# Patient Record
Sex: FEMALE | Race: WHITE | Employment: OTHER | URBAN - METROPOLITAN AREA
[De-identification: names, ages, dates, MRNs, and addresses within clinical notes are randomized per-mention and may not be internally consistent; named-entity substitution may affect disease eponyms.]

---

## 2017-02-26 ENCOUNTER — HOSPITAL ENCOUNTER (EMERGENCY)
Age: 67
Discharge: HOME | End: 2017-02-26
Attending: EMERGENCY MEDICINE
Payer: MEDICARE

## 2017-02-26 VITALS
DIASTOLIC BLOOD PRESSURE: 82 MMHG | HEART RATE: 83 BPM | RESPIRATION RATE: 16 BRPM | BODY MASS INDEX: 46.95 KG/M2 | WEIGHT: 275 LBS | SYSTOLIC BLOOD PRESSURE: 175 MMHG | OXYGEN SATURATION: 96 % | HEIGHT: 64 IN | TEMPERATURE: 98.1 F

## 2017-02-26 DIAGNOSIS — J02.9 ACUTE PHARYNGITIS, UNSPECIFIED ETIOLOGY: ICD-10-CM

## 2017-02-26 DIAGNOSIS — J20.9 ACUTE BRONCHITIS, UNSPECIFIED ORGANISM: Primary | ICD-10-CM

## 2017-02-26 PROCEDURE — 6370000000 HC RX 637 (ALT 250 FOR IP): Performed by: EMERGENCY MEDICINE

## 2017-02-26 PROCEDURE — 99283 EMERGENCY DEPT VISIT LOW MDM: CPT

## 2017-02-26 RX ORDER — AZITHROMYCIN 250 MG/1
250 TABLET, FILM COATED ORAL DAILY
Qty: 4 TABLET | Refills: 0 | Status: SHIPPED | OUTPATIENT
Start: 2017-02-26

## 2017-02-26 RX ORDER — AZITHROMYCIN 250 MG/1
500 TABLET, FILM COATED ORAL DAILY
Status: DISCONTINUED | OUTPATIENT
Start: 2017-02-26 | End: 2017-02-26 | Stop reason: HOSPADM

## 2017-02-26 RX ADMIN — AZITHROMYCIN 500 MG: 250 TABLET, FILM COATED ORAL at 15:35

## 2017-02-26 ASSESSMENT — ENCOUNTER SYMPTOMS
TROUBLE SWALLOWING: 0
SHORTNESS OF BREATH: 0
DIARRHEA: 0
BACK PAIN: 0
SORE THROAT: 1
VOMITING: 0
NAUSEA: 0
ABDOMINAL PAIN: 0
COLOR CHANGE: 0
COUGH: 1
CHEST TIGHTNESS: 0

## 2017-02-26 ASSESSMENT — PAIN DESCRIPTION - PAIN TYPE: TYPE: ACUTE PAIN

## 2017-02-26 ASSESSMENT — PAIN DESCRIPTION - DESCRIPTORS: DESCRIPTORS: OTHER (COMMENT)

## 2017-02-26 ASSESSMENT — PAIN DESCRIPTION - FREQUENCY: FREQUENCY: INTERMITTENT

## 2017-02-26 ASSESSMENT — PAIN DESCRIPTION - LOCATION: LOCATION: CHEST

## 2017-02-26 ASSESSMENT — PAIN SCALES - GENERAL: PAINLEVEL_OUTOF10: 6

## 2022-12-15 ENCOUNTER — APPOINTMENT (OUTPATIENT)
Dept: CT IMAGING | Age: 72
DRG: 202 | End: 2022-12-15
Payer: COMMERCIAL

## 2022-12-15 ENCOUNTER — HOSPITAL ENCOUNTER (INPATIENT)
Age: 72
LOS: 1 days | Discharge: HOME | DRG: 202 | End: 2022-12-19
Attending: INTERNAL MEDICINE | Admitting: INTERNAL MEDICINE
Payer: COMMERCIAL

## 2022-12-15 ENCOUNTER — APPOINTMENT (OUTPATIENT)
Dept: GENERAL RADIOLOGY | Age: 72
DRG: 202 | End: 2022-12-15
Payer: COMMERCIAL

## 2022-12-15 DIAGNOSIS — R06.82 TACHYPNEA: ICD-10-CM

## 2022-12-15 DIAGNOSIS — D64.9 ANEMIA, UNSPECIFIED TYPE: Primary | ICD-10-CM

## 2022-12-15 DIAGNOSIS — E11.69 TYPE 2 DIABETES MELLITUS WITH OTHER SPECIFIED COMPLICATION, WITHOUT LONG-TERM CURRENT USE OF INSULIN (HCC): ICD-10-CM

## 2022-12-15 DIAGNOSIS — R06.02 SHORTNESS OF BREATH: ICD-10-CM

## 2022-12-15 LAB
ALBUMIN SERPL-MCNC: 4.3 G/DL (ref 3.5–4.6)
ALP BLD-CCNC: 108 U/L (ref 40–130)
ALT SERPL-CCNC: 17 U/L (ref 0–33)
AMPHETAMINE SCREEN, URINE: NORMAL
ANION GAP SERPL CALCULATED.3IONS-SCNC: 13 MEQ/L (ref 9–15)
APTT: 26.7 SEC (ref 24.4–36.8)
AST SERPL-CCNC: 20 U/L (ref 0–35)
BARBITURATE SCREEN URINE: NORMAL
BASE EXCESS ARTERIAL: 2 (ref -3–3)
BASOPHILS ABSOLUTE: 0 K/UL (ref 0–0.2)
BASOPHILS RELATIVE PERCENT: 0.6 %
BENZODIAZEPINE SCREEN, URINE: NORMAL
BILIRUB SERPL-MCNC: 0.3 MG/DL (ref 0.2–0.7)
BILIRUBIN URINE: NEGATIVE
BLOOD, URINE: NEGATIVE
BUN BLDV-MCNC: 18 MG/DL (ref 8–23)
CALCIUM IONIZED: 1.13 MMOL/L (ref 1.12–1.32)
CALCIUM SERPL-MCNC: 10 MG/DL (ref 8.5–9.9)
CANNABINOID SCREEN URINE: NORMAL
CHLORIDE BLD-SCNC: 99 MEQ/L (ref 95–107)
CLARITY: CLEAR
CO2: 29 MEQ/L (ref 20–31)
COCAINE METABOLITE SCREEN URINE: NORMAL
COLOR: YELLOW
CREAT SERPL-MCNC: 0.62 MG/DL (ref 0.5–0.9)
EOSINOPHILS ABSOLUTE: 0 K/UL (ref 0–0.7)
EOSINOPHILS RELATIVE PERCENT: 0.8 %
FENTANYL SCREEN, URINE: NORMAL
GFR SERPL CREATININE-BSD FRML MDRD: >60 ML/MIN/{1.73_M2}
GFR SERPL CREATININE-BSD FRML MDRD: >60 ML/MIN/{1.73_M2}
GLOBULIN: 2.5 G/DL (ref 2.3–3.5)
GLUCOSE BLD-MCNC: 109 MG/DL (ref 70–99)
GLUCOSE BLD-MCNC: 110 MG/DL (ref 70–99)
GLUCOSE BLD-MCNC: 164 MG/DL (ref 70–99)
GLUCOSE URINE: NEGATIVE MG/DL
HCO3 ARTERIAL: 22 MMOL/L (ref 21–29)
HCT VFR BLD CALC: 27.9 % (ref 37–47)
HEMOGLOBIN: 13.3 GM/DL (ref 12–16)
HEMOGLOBIN: 9.1 G/DL (ref 12–16)
INFLUENZA A BY PCR: NEGATIVE
INFLUENZA B BY PCR: NEGATIVE
INR BLD: 0.9
KETONES, URINE: NEGATIVE MG/DL
LACTATE: 0.98 MMOL/L (ref 0.4–2)
LACTIC ACID, SEPSIS: 1.3 MMOL/L (ref 0.5–1.9)
LEUKOCYTE ESTERASE, URINE: NEGATIVE
LIPASE: 15 U/L (ref 12–95)
LYMPHOCYTES ABSOLUTE: 1.4 K/UL (ref 1–4.8)
LYMPHOCYTES RELATIVE PERCENT: 25.1 %
Lab: NORMAL
MAGNESIUM: 2.1 MG/DL (ref 1.7–2.4)
MCH RBC QN AUTO: 30.3 PG (ref 27–31.3)
MCHC RBC AUTO-ENTMCNC: 32.6 % (ref 33–37)
MCV RBC AUTO: 93 FL (ref 79.4–94.8)
METHADONE SCREEN, URINE: NORMAL
MONOCYTES ABSOLUTE: 0.4 K/UL (ref 0.2–0.8)
MONOCYTES RELATIVE PERCENT: 7.2 %
NEUTROPHILS ABSOLUTE: 3.8 K/UL (ref 1.4–6.5)
NEUTROPHILS RELATIVE PERCENT: 66.3 %
NITRITE, URINE: NEGATIVE
O2 SAT, ARTERIAL: 99 % (ref 93–100)
OPIATE SCREEN URINE: NORMAL
OXYCODONE URINE: NORMAL
PCO2 ARTERIAL: 18 MM HG (ref 35–45)
PDW BLD-RTO: 14.2 % (ref 11.5–14.5)
PERFORMED ON: ABNORMAL
PERFORMED ON: ABNORMAL
PH ARTERIAL: 7.7 (ref 7.35–7.45)
PH UA: 8.5 (ref 5–9)
PHENCYCLIDINE SCREEN URINE: NORMAL
PLATELET # BLD: 220 K/UL (ref 130–400)
PO2 ARTERIAL: 98 MM HG (ref 75–108)
POC CHLORIDE: 107 MEQ/L (ref 99–110)
POC CREATININE WHOLE BLOOD: 0.2
POC CREATININE: 0.7 MG/DL (ref 0.6–1.2)
POC FIO2: 21
POC HEMATOCRIT: 39 % (ref 36–48)
POC POTASSIUM: 3.1 MEQ/L (ref 3.5–5.1)
POC SAMPLE TYPE: ABNORMAL
POC SODIUM: 140 MEQ/L (ref 136–145)
POTASSIUM SERPL-SCNC: 3.5 MEQ/L (ref 3.4–4.9)
PRO-BNP: 70 PG/ML
PROCALCITONIN: 0.02 NG/ML (ref 0–0.15)
PROPOXYPHENE SCREEN: NORMAL
PROTEIN UA: NEGATIVE MG/DL
PROTHROMBIN TIME: 11.8 SEC (ref 12.3–14.9)
RBC # BLD: 3 M/UL (ref 4.2–5.4)
SARS-COV-2, NAAT: NOT DETECTED
SODIUM BLD-SCNC: 141 MEQ/L (ref 135–144)
SPECIFIC GRAVITY UA: 1.01 (ref 1–1.03)
TCO2 ARTERIAL: 23 MMOL/L (ref 21–32)
TOTAL CK: 72 U/L (ref 0–170)
TOTAL PROTEIN: 6.8 G/DL (ref 6.3–8)
TROPONIN: <0.01 NG/ML (ref 0–0.01)
TSH REFLEX: 1.12 UIU/ML (ref 0.44–3.86)
URINE REFLEX TO CULTURE: NORMAL
UROBILINOGEN, URINE: 0.2 E.U./DL
WBC # BLD: 5.8 K/UL (ref 4.8–10.8)

## 2022-12-15 PROCEDURE — 83690 ASSAY OF LIPASE: CPT

## 2022-12-15 PROCEDURE — 84484 ASSAY OF TROPONIN QUANT: CPT

## 2022-12-15 PROCEDURE — 83605 ASSAY OF LACTIC ACID: CPT

## 2022-12-15 PROCEDURE — 93005 ELECTROCARDIOGRAM TRACING: CPT | Performed by: EMERGENCY MEDICINE

## 2022-12-15 PROCEDURE — 71275 CT ANGIOGRAPHY CHEST: CPT

## 2022-12-15 PROCEDURE — 6360000004 HC RX CONTRAST MEDICATION: Performed by: PERSONAL EMERGENCY RESPONSE ATTENDANT

## 2022-12-15 PROCEDURE — 84132 ASSAY OF SERUM POTASSIUM: CPT

## 2022-12-15 PROCEDURE — 81003 URINALYSIS AUTO W/O SCOPE: CPT

## 2022-12-15 PROCEDURE — 82803 BLOOD GASES ANY COMBINATION: CPT

## 2022-12-15 PROCEDURE — 85014 HEMATOCRIT: CPT

## 2022-12-15 PROCEDURE — 85025 COMPLETE CBC W/AUTO DIFF WBC: CPT

## 2022-12-15 PROCEDURE — 36415 COLL VENOUS BLD VENIPUNCTURE: CPT

## 2022-12-15 PROCEDURE — 87502 INFLUENZA DNA AMP PROBE: CPT

## 2022-12-15 PROCEDURE — 87040 BLOOD CULTURE FOR BACTERIA: CPT

## 2022-12-15 PROCEDURE — 82607 VITAMIN B-12: CPT

## 2022-12-15 PROCEDURE — 36600 WITHDRAWAL OF ARTERIAL BLOOD: CPT

## 2022-12-15 PROCEDURE — 99285 EMERGENCY DEPT VISIT HI MDM: CPT

## 2022-12-15 PROCEDURE — 84295 ASSAY OF SERUM SODIUM: CPT

## 2022-12-15 PROCEDURE — 71045 X-RAY EXAM CHEST 1 VIEW: CPT

## 2022-12-15 PROCEDURE — 82948 REAGENT STRIP/BLOOD GLUCOSE: CPT

## 2022-12-15 PROCEDURE — 85730 THROMBOPLASTIN TIME PARTIAL: CPT

## 2022-12-15 PROCEDURE — 87150 DNA/RNA AMPLIFIED PROBE: CPT

## 2022-12-15 PROCEDURE — G0378 HOSPITAL OBSERVATION PER HR: HCPCS

## 2022-12-15 PROCEDURE — 84145 PROCALCITONIN (PCT): CPT

## 2022-12-15 PROCEDURE — 85610 PROTHROMBIN TIME: CPT

## 2022-12-15 PROCEDURE — 82550 ASSAY OF CK (CPK): CPT

## 2022-12-15 PROCEDURE — 83550 IRON BINDING TEST: CPT

## 2022-12-15 PROCEDURE — 82728 ASSAY OF FERRITIN: CPT

## 2022-12-15 PROCEDURE — 83735 ASSAY OF MAGNESIUM: CPT

## 2022-12-15 PROCEDURE — 82435 ASSAY OF BLOOD CHLORIDE: CPT

## 2022-12-15 PROCEDURE — 96375 TX/PRO/DX INJ NEW DRUG ADDON: CPT

## 2022-12-15 PROCEDURE — 82746 ASSAY OF FOLIC ACID SERUM: CPT

## 2022-12-15 PROCEDURE — 87635 SARS-COV-2 COVID-19 AMP PRB: CPT

## 2022-12-15 PROCEDURE — 6360000002 HC RX W HCPCS: Performed by: PERSONAL EMERGENCY RESPONSE ATTENDANT

## 2022-12-15 PROCEDURE — 80053 COMPREHEN METABOLIC PANEL: CPT

## 2022-12-15 PROCEDURE — 82330 ASSAY OF CALCIUM: CPT

## 2022-12-15 PROCEDURE — 80307 DRUG TEST PRSMV CHEM ANLYZR: CPT

## 2022-12-15 PROCEDURE — 84443 ASSAY THYROID STIM HORMONE: CPT

## 2022-12-15 PROCEDURE — 82565 ASSAY OF CREATININE: CPT

## 2022-12-15 PROCEDURE — 96365 THER/PROPH/DIAG IV INF INIT: CPT

## 2022-12-15 PROCEDURE — 70450 CT HEAD/BRAIN W/O DYE: CPT

## 2022-12-15 PROCEDURE — 83540 ASSAY OF IRON: CPT

## 2022-12-15 PROCEDURE — 83880 ASSAY OF NATRIURETIC PEPTIDE: CPT

## 2022-12-15 RX ORDER — MAGNESIUM SULFATE IN WATER 40 MG/ML
2000 INJECTION, SOLUTION INTRAVENOUS ONCE
Status: COMPLETED | OUTPATIENT
Start: 2022-12-15 | End: 2022-12-15

## 2022-12-15 RX ORDER — METHYLPREDNISOLONE SODIUM SUCCINATE 125 MG/2ML
125 INJECTION, POWDER, LYOPHILIZED, FOR SOLUTION INTRAMUSCULAR; INTRAVENOUS ONCE
Status: COMPLETED | OUTPATIENT
Start: 2022-12-15 | End: 2022-12-15

## 2022-12-15 RX ADMIN — METHYLPREDNISOLONE SODIUM SUCCINATE 125 MG: 125 INJECTION, POWDER, FOR SOLUTION INTRAMUSCULAR; INTRAVENOUS at 19:13

## 2022-12-15 RX ADMIN — MAGNESIUM SULFATE HEPTAHYDRATE 2000 MG: 40 INJECTION, SOLUTION INTRAVENOUS at 19:10

## 2022-12-15 RX ADMIN — IOPAMIDOL 50 ML: 612 INJECTION, SOLUTION INTRAVENOUS at 20:29

## 2022-12-15 ASSESSMENT — ENCOUNTER SYMPTOMS
DIARRHEA: 1
SORE THROAT: 0
WHEEZING: 1
VOMITING: 0
RHINORRHEA: 0
COUGH: 1
BLOOD IN STOOL: 0
COLOR CHANGE: 0
NAUSEA: 0
ABDOMINAL PAIN: 0
SHORTNESS OF BREATH: 1

## 2022-12-15 ASSESSMENT — PAIN - FUNCTIONAL ASSESSMENT: PAIN_FUNCTIONAL_ASSESSMENT: NONE - DENIES PAIN

## 2022-12-15 NOTE — ED TRIAGE NOTES
PATIENT TO ED WITH C/O SOB X 1 WEEK  PATIENT DENIES  OXYGEN USE AT HOME  PATIENT STATES SOB WORSENED TODAY. PATIENT 100% RA  PATIENT GIVEN DUONEB ENROUTE TO ED BY EMS  PATIENT STATES SHE USUALLY GETS AN ANTIBIOTIC, ZPAK AND GETS BETTER.    PATIENT ALERT AND ORIENTED X 4  PATIENT PLACED ON MONITOR  SKIN WARM AND DRY  PATIENT AFEBRILE

## 2022-12-15 NOTE — ED NOTES
Pt c/o of SOB. Pt states she was at urgent care when they sent her here. Pt able to ambulate to toilet from stretcher. Pt has steady gait. Pt able to answer questions appropriately. Pt placed on cardiac monitor, ekg done at bedside.  Pt is a&ox4     Andreia Bates RN  12/15/22 2029

## 2022-12-16 PROBLEM — E87.3 ACUTE RESPIRATORY ALKALOSIS: Status: ACTIVE | Noted: 2022-12-16

## 2022-12-16 PROBLEM — R06.02 SHORTNESS OF BREATH: Status: ACTIVE | Noted: 2022-12-16

## 2022-12-16 LAB
BASE EXCESS ARTERIAL: -2 (ref -3–3)
BASOPHILS ABSOLUTE: 0 K/UL (ref 0–0.2)
BASOPHILS RELATIVE PERCENT: 0.2 %
CALCIUM IONIZED: 1.2 MMOL/L (ref 1.12–1.32)
EKG ATRIAL RATE: 79 BPM
EKG P AXIS: 35 DEGREES
EKG P-R INTERVAL: 158 MS
EKG Q-T INTERVAL: 412 MS
EKG QRS DURATION: 88 MS
EKG QTC CALCULATION (BAZETT): 472 MS
EKG R AXIS: -20 DEGREES
EKG T AXIS: 32 DEGREES
EKG VENTRICULAR RATE: 79 BPM
EOSINOPHILS ABSOLUTE: 0 K/UL (ref 0–0.7)
EOSINOPHILS RELATIVE PERCENT: 0 %
FERRITIN: 28 NG/ML (ref 13–150)
FOLATE: >20 NG/ML
GFR SERPL CREATININE-BSD FRML MDRD: >60 ML/MIN/{1.73_M2}
GFR SERPL CREATININE-BSD FRML MDRD: >60 ML/MIN/{1.73_M2}
GLUCOSE BLD-MCNC: 248 MG/DL (ref 70–99)
GLUCOSE BLD-MCNC: 253 MG/DL (ref 70–99)
GLUCOSE BLD-MCNC: 276 MG/DL (ref 70–99)
GLUCOSE BLD-MCNC: 277 MG/DL (ref 70–99)
GLUCOSE BLD-MCNC: 288 MG/DL (ref 70–99)
HCO3 ARTERIAL: 22.7 MMOL/L (ref 21–29)
HCT VFR BLD CALC: 38.6 % (ref 37–47)
HEMOGLOBIN: 12.8 G/DL (ref 12–16)
HEMOGLOBIN: 13.6 GM/DL (ref 12–16)
IRON SATURATION: 15 % (ref 20–55)
IRON: 58 UG/DL (ref 37–145)
LACTATE: 0.63 MMOL/L (ref 0.4–2)
LYMPHOCYTES ABSOLUTE: 0.7 K/UL (ref 1–4.8)
LYMPHOCYTES RELATIVE PERCENT: 8.7 %
MCH RBC QN AUTO: 30.3 PG (ref 27–31.3)
MCHC RBC AUTO-ENTMCNC: 33.2 % (ref 33–37)
MCV RBC AUTO: 91.3 FL (ref 79.4–94.8)
MONOCYTES ABSOLUTE: 0.1 K/UL (ref 0.2–0.8)
MONOCYTES RELATIVE PERCENT: 1.7 %
NEUTROPHILS ABSOLUTE: 6.8 K/UL (ref 1.4–6.5)
NEUTROPHILS RELATIVE PERCENT: 89.4 %
O2 SAT, ARTERIAL: 96 % (ref 93–100)
PCO2 ARTERIAL: 35 MM HG (ref 35–45)
PDW BLD-RTO: 14.6 % (ref 11.5–14.5)
PERFORMED ON: ABNORMAL
PH ARTERIAL: 7.41 (ref 7.35–7.45)
PLATELET # BLD: 319 K/UL (ref 130–400)
PO2 ARTERIAL: 82 MM HG (ref 75–108)
POC CHLORIDE: 107 MEQ/L (ref 99–110)
POC CREATININE: 0.6 MG/DL (ref 0.6–1.2)
POC CREATININE: <0.2 MG/DL (ref 0.6–1.2)
POC HEMATOCRIT: 40 % (ref 36–48)
POC POTASSIUM: 3.8 MEQ/L (ref 3.5–5.1)
POC SAMPLE TYPE: ABNORMAL
POC SAMPLE TYPE: ABNORMAL
POC SODIUM: 140 MEQ/L (ref 136–145)
RBC # BLD: 4.23 M/UL (ref 4.2–5.4)
REASON FOR REJECTION: NORMAL
REJECTED TEST: NORMAL
TCO2 ARTERIAL: 24 MMOL/L (ref 21–32)
TOTAL IRON BINDING CAPACITY: 380 UG/DL (ref 250–450)
UNSATURATED IRON BINDING CAPACITY: 322 UG/DL (ref 112–347)
VITAMIN B-12: 1853 PG/ML (ref 232–1245)
WBC # BLD: 7.7 K/UL (ref 4.8–10.8)

## 2022-12-16 PROCEDURE — G0378 HOSPITAL OBSERVATION PER HR: HCPCS

## 2022-12-16 PROCEDURE — 94761 N-INVAS EAR/PLS OXIMETRY MLT: CPT

## 2022-12-16 PROCEDURE — 94667 MNPJ CHEST WALL 1ST: CPT

## 2022-12-16 PROCEDURE — 6370000000 HC RX 637 (ALT 250 FOR IP): Performed by: INTERNAL MEDICINE

## 2022-12-16 PROCEDURE — 82435 ASSAY OF BLOOD CHLORIDE: CPT

## 2022-12-16 PROCEDURE — 82330 ASSAY OF CALCIUM: CPT

## 2022-12-16 PROCEDURE — 36600 WITHDRAWAL OF ARTERIAL BLOOD: CPT

## 2022-12-16 PROCEDURE — 82565 ASSAY OF CREATININE: CPT

## 2022-12-16 PROCEDURE — 82803 BLOOD GASES ANY COMBINATION: CPT

## 2022-12-16 PROCEDURE — 84132 ASSAY OF SERUM POTASSIUM: CPT

## 2022-12-16 PROCEDURE — 87040 BLOOD CULTURE FOR BACTERIA: CPT

## 2022-12-16 PROCEDURE — 99223 1ST HOSP IP/OBS HIGH 75: CPT | Performed by: INTERNAL MEDICINE

## 2022-12-16 PROCEDURE — 94664 DEMO&/EVAL PT USE INHALER: CPT

## 2022-12-16 PROCEDURE — 36415 COLL VENOUS BLD VENIPUNCTURE: CPT

## 2022-12-16 PROCEDURE — 83605 ASSAY OF LACTIC ACID: CPT

## 2022-12-16 PROCEDURE — 85014 HEMATOCRIT: CPT

## 2022-12-16 PROCEDURE — 94640 AIRWAY INHALATION TREATMENT: CPT

## 2022-12-16 PROCEDURE — 93010 ELECTROCARDIOGRAM REPORT: CPT | Performed by: INTERNAL MEDICINE

## 2022-12-16 PROCEDURE — 96372 THER/PROPH/DIAG INJ SC/IM: CPT

## 2022-12-16 PROCEDURE — 6360000002 HC RX W HCPCS: Performed by: INTERNAL MEDICINE

## 2022-12-16 PROCEDURE — 84295 ASSAY OF SERUM SODIUM: CPT

## 2022-12-16 PROCEDURE — 85025 COMPLETE CBC W/AUTO DIFF WBC: CPT

## 2022-12-16 RX ORDER — PRAVASTATIN SODIUM 40 MG
40 TABLET ORAL NIGHTLY
COMMUNITY
Start: 2022-11-08

## 2022-12-16 RX ORDER — IPRATROPIUM BROMIDE AND ALBUTEROL SULFATE 2.5; .5 MG/3ML; MG/3ML
1 SOLUTION RESPIRATORY (INHALATION) EVERY 4 HOURS PRN
Status: DISCONTINUED | OUTPATIENT
Start: 2022-12-16 | End: 2022-12-17

## 2022-12-16 RX ORDER — ENOXAPARIN SODIUM 100 MG/ML
40 INJECTION SUBCUTANEOUS DAILY
Status: DISCONTINUED | OUTPATIENT
Start: 2022-12-16 | End: 2022-12-19 | Stop reason: HOSPADM

## 2022-12-16 RX ORDER — IPRATROPIUM BROMIDE AND ALBUTEROL SULFATE 2.5; .5 MG/3ML; MG/3ML
1 SOLUTION RESPIRATORY (INHALATION)
Status: DISCONTINUED | OUTPATIENT
Start: 2022-12-16 | End: 2022-12-17

## 2022-12-16 RX ORDER — ALBUTEROL SULFATE 2.5 MG/3ML
2.5 SOLUTION RESPIRATORY (INHALATION) EVERY 6 HOURS PRN
Status: ON HOLD | COMMUNITY
Start: 2020-01-30 | End: 2022-12-19 | Stop reason: SDUPTHER

## 2022-12-16 RX ORDER — INSULIN LISPRO 100 [IU]/ML
0-4 INJECTION, SOLUTION INTRAVENOUS; SUBCUTANEOUS
Status: DISCONTINUED | OUTPATIENT
Start: 2022-12-16 | End: 2022-12-19 | Stop reason: HOSPADM

## 2022-12-16 RX ORDER — ALBUTEROL SULFATE 2.5 MG/3ML
2.5 SOLUTION RESPIRATORY (INHALATION) EVERY 6 HOURS PRN
Status: DISCONTINUED | OUTPATIENT
Start: 2022-12-16 | End: 2022-12-17

## 2022-12-16 RX ORDER — LISINOPRIL 40 MG/1
40 TABLET ORAL DAILY
COMMUNITY
Start: 2022-09-09

## 2022-12-16 RX ORDER — DEXTROSE MONOHYDRATE 100 MG/ML
INJECTION, SOLUTION INTRAVENOUS CONTINUOUS PRN
Status: DISCONTINUED | OUTPATIENT
Start: 2022-12-16 | End: 2022-12-19 | Stop reason: HOSPADM

## 2022-12-16 RX ORDER — GLIPIZIDE 5 MG/1
5 TABLET ORAL
Status: DISCONTINUED | OUTPATIENT
Start: 2022-12-17 | End: 2022-12-19 | Stop reason: HOSPADM

## 2022-12-16 RX ORDER — PREDNISONE 20 MG/1
40 TABLET ORAL DAILY
Status: DISCONTINUED | OUTPATIENT
Start: 2022-12-16 | End: 2022-12-19 | Stop reason: HOSPADM

## 2022-12-16 RX ORDER — GLIMEPIRIDE 2 MG/1
2 TABLET ORAL DAILY
COMMUNITY
Start: 2022-11-04

## 2022-12-16 RX ORDER — MELOXICAM 15 MG/1
15 TABLET ORAL DAILY
Status: ON HOLD | COMMUNITY
Start: 2020-05-20 | End: 2022-12-16 | Stop reason: HOSPADM

## 2022-12-16 RX ORDER — LISINOPRIL 20 MG/1
40 TABLET ORAL DAILY
Status: DISCONTINUED | OUTPATIENT
Start: 2022-12-16 | End: 2022-12-19 | Stop reason: HOSPADM

## 2022-12-16 RX ORDER — HYDRALAZINE HYDROCHLORIDE 20 MG/ML
10 INJECTION INTRAMUSCULAR; INTRAVENOUS EVERY 4 HOURS PRN
Status: DISCONTINUED | OUTPATIENT
Start: 2022-12-16 | End: 2022-12-19 | Stop reason: HOSPADM

## 2022-12-16 RX ORDER — INSULIN LISPRO 100 [IU]/ML
0-4 INJECTION, SOLUTION INTRAVENOUS; SUBCUTANEOUS NIGHTLY
Status: DISCONTINUED | OUTPATIENT
Start: 2022-12-16 | End: 2022-12-19 | Stop reason: HOSPADM

## 2022-12-16 RX ORDER — PRAVASTATIN SODIUM 40 MG
40 TABLET ORAL NIGHTLY
Status: DISCONTINUED | OUTPATIENT
Start: 2022-12-16 | End: 2022-12-19 | Stop reason: HOSPADM

## 2022-12-16 RX ORDER — GLIMEPIRIDE 2 MG/1
2 TABLET ORAL DAILY
Status: DISCONTINUED | OUTPATIENT
Start: 2022-12-16 | End: 2022-12-16 | Stop reason: CLARIF

## 2022-12-16 RX ADMIN — PRAVASTATIN SODIUM 40 MG: 40 TABLET ORAL at 21:26

## 2022-12-16 RX ADMIN — LISINOPRIL 40 MG: 20 TABLET ORAL at 12:13

## 2022-12-16 RX ADMIN — PREDNISONE 40 MG: 20 TABLET ORAL at 12:18

## 2022-12-16 RX ADMIN — ENOXAPARIN SODIUM 40 MG: 100 INJECTION SUBCUTANEOUS at 10:35

## 2022-12-16 RX ADMIN — INSULIN LISPRO 1 UNITS: 100 INJECTION, SOLUTION INTRAVENOUS; SUBCUTANEOUS at 17:16

## 2022-12-16 RX ADMIN — INSULIN LISPRO 2 UNITS: 100 INJECTION, SOLUTION INTRAVENOUS; SUBCUTANEOUS at 12:15

## 2022-12-16 RX ADMIN — INSULIN LISPRO 2 UNITS: 100 INJECTION, SOLUTION INTRAVENOUS; SUBCUTANEOUS at 10:36

## 2022-12-16 RX ADMIN — METFORMIN HYDROCHLORIDE 1000 MG: 500 TABLET ORAL at 21:26

## 2022-12-16 RX ADMIN — IPRATROPIUM BROMIDE AND ALBUTEROL SULFATE 1 AMPULE: 2.5; .5 SOLUTION RESPIRATORY (INHALATION) at 13:10

## 2022-12-16 RX ADMIN — IPRATROPIUM BROMIDE AND ALBUTEROL SULFATE 1 AMPULE: 2.5; .5 SOLUTION RESPIRATORY (INHALATION) at 18:14

## 2022-12-16 RX ADMIN — METFORMIN HYDROCHLORIDE 1000 MG: 500 TABLET ORAL at 12:13

## 2022-12-16 NOTE — ED NOTES
Change of shift report received from Bishop Trujillo [facility]. This RN taking over care for pt at this time.       Vibhagerry Garcias, RN  12/15/22 1922

## 2022-12-16 NOTE — PROGRESS NOTES
NF to [address] Formulary: Substitution Information  All Therapeutic Interchanges are equivalent frequency unless otherwise noted    NonFormulary Medication           Formulary Interchange    Glimepiride (Amaryl) 1mg              Glipizide 2.5 mg    Glimepiride (Amaryl) 2mg           Glipizide 5 mg    Glimepiride (Amaryl) 4mg              Glipizide 10 mg

## 2022-12-16 NOTE — ED NOTES
Pt a+ox4, respirations even and unlabored at rest. No s/s distress observed. Pt transported to [facility] at this time in stable condition. Family member aware of admission room number at time of departure.       Ramonia Sandifer, RN  12/16/22 4498

## 2022-12-16 NOTE — FLOWSHEET NOTE
4376- Admission completed. Vitals stable on RA. Pt reports she has been more SOB over the past few days. Reports she had a hard time getting from the car into the urgent care bc she was so SOB so they called squad. Lung clear/diminished. Pt denies CP. 2 nurse 4 eye skin assessment WNL. Discussed plan of care. No other concerns at this time. Falls precautions in place. Call light in reach. Team to continue to monitor. Electronically signed by Gokul Mosher RN on 12/16/2022 at 2:08 AM     0125-Perfectserve to Dr. Antonina Britt regarding home med rec.     0500- Pt placed on 2L for comfort- using accessory muscles and reports SOB despite normal SpO2.

## 2022-12-16 NOTE — ED NOTES
While collecting vitals on pt. I was able to ask her a few questions. Pt responded appropriately and clearly to questions. When asked if she had any difficulty remembering or speaking earlier on in the day the pt. Agreed. Pt. Stated \"I have difficulty answering questions when I have trouble breathing. \"      Thelma Robins  12/15/22 1908

## 2022-12-16 NOTE — PROGRESS NOTES
[facility] Respiratory Therapy Evaluation   Current Order:  DUONEB Q4 PRN       Home Regimen: PRN       Ordering Physician: ELIANA  Re-evaluation Date:  EVAL DONE      Diagnosis: TACHYPNEA       Patient Status: Stable / Unstable + Physician notified    The following MDI Criteria must be met in order to convert aerosol to MDI with spacer. If unable to meet, MDI will be converted to aerosol:  []  Patient able to demonstrate the ability to use MDI effectively  []  Patient alert and cooperative  []  Patient able to take deep breath with 5-10 second hold  []  Medication(s) available in this delivery method   []  Peak flow greater than or equal to 200 ml/min            Current Order Substituted To  (same drug, same frequency)   Aerosol to MDI [] Albuterol Sulfate 0.083% unit dose by aerosol Albuterol Sulfate MDI 2 puffs by inhalation with spacer    [] Levalbuterol 1.25 mg unit dose by aerosol Levalbuterol MDI 2 puffs by inhalation with spacer    [] Levalbuterol 0.63 mg unit dose by aerosol Levalbuterol MDI 2 puffs by inhalation with spacer    [] Ipratropium Bromide 0.02% unit dose by aerosol Ipratropium Bromide MDI 2 puffs by inhalation with spacer    [] Duoneb (Ipratropium + Albuterol) unit dose by aerosol Ipratropium MDI + Albuterol MDI 2 puffs by inhalation w/spacer   MDI to Aerosol [] Albuterol Sulfate MDI Albuterol Sulfate 0.083% unit dose by aerosol    [] Levalbuterol MDI 2 puffs by inhalation Levalbuterol 1.25 mg unit dose by aerosol    [] Ipratropium Bromide MDI by inhalation Ipratropium Bromide 0.02% unit dose by aerosol    [] Combivent (Ipratropium + Albuterol) MDI by inhalation Duoneb (Ipratropium + Albuterol) unit dose by aerosol       Treatment Assessment [Frequency/Schedule]:  Change frequency to: ________NO CHANGES __________________________________________per Protocol, P&T, [facility]      Points 0 1 2 3 4   Pulmonary Status  Non-Smoker  [x]   Smoking history   < 20 pack years  []   Smoking history  ?  20 pack years  []   Pulmonary Disorder  (acute or chronic)  []   Severe or Chronic w/ Exacerbation  []     Surgical Status No [x]   Surgeries     General []   Surgery Lower []   Abdominal Thoracic or []   Upper Abdominal Thoracic with  PulmonaryDisorder  []     Chest X-ray Clear/Not  Ordered     [x]  Chronic Changes  Results Pending  []  Infiltrates, atelectasis, pleural effusion, or edema  []  Infiltrates in more than one lobe []  Infiltrate + Atelectasis, &/or pleural effusion  []    Respiratory Pattern Regular,  RR = 12-20 [x]  Increased,  RR = 21-25 []  MAYA, irregular,  or RR = 26-30 []  Decreased FEV1  or RR = 31-35 []  Severe SOB, use  of accessory muscles, or RR ? 35  []    Mental Status Alert, oriented,  Cooperative [x]  Confused but Follows commands []  Lethargic or unable to follow commands []  Obtunded  []  Comatose  []    Breath Sounds Clear to  auscultation  []  Decreased unilaterally or  in bases only [x]  Decreased  bilaterally  []  Crackles or intermittent wheezes []  Wheezes []    Cough Strong, Spontan., & nonproductive [x]  Strong,  spontaneous, &  productive []  Weak,  Nonproductive []  Weak, productive or  with wheezes []  No spontaneous  cough or may require suctioning []    Level of Activity Ambulatory []  Ambulatory w/ Assist  [x]  Non-ambulatory []  Paraplegic []  Quadriplegic []    Total    Score:____2___     Triage Score:____5____      Tri       Triage:     1. (>20) Freq: Q3    2. (16-20) Freq: Q4   3. (11-15) Freq: QID & Albuterol Q2 PRN    4. (6-10) Freq: TID & Albuterol Q2 PRN    5. (0-5) Freq Q4prn

## 2022-12-16 NOTE — DISCHARGE INSTRUCTIONS
Follow up with primary care physician in the next 7 days or sooner if needed. If you do not have a Primary care physician, please schedule an appointment with one. Please ask prior to discharge about a list of local providers. Please return to ER or call 911 if you develop any significant signs or symptoms. I may not have addressed all of your medical illnesses or the abnormal blood work or imaging therefore please ask your PCP to obtain [facility] record to follow up on all of the abnormal labs, imaging and findings that I have and have not addressed during your hospitalization. Discharging you from the hospital does not mean that your medical care ends here and now. You may still need additional work up, investigation, monitoring, and treatment to be handled from this point on by outside providers including your PCP, Specialists and other healthcare providers. For medication questions, contact your retail pharmacy and your PCP. Your medical team at [facility] appreciates the opportunity to work with you to get well!     Carolina Correia,   8:54 AM

## 2022-12-16 NOTE — CONSULTS
Pulmonary Medicine  Consult Note      Reason for consultation: Shortness of breath    Consulting physician: Dr. Claire Cobb:      This is 60-year-old female who was presented with shortness of breath, dizziness and lightheadedness. She has history of bladder cancer in the past currently in remission. She has hypertension and remote history of chronic bronchitis. She said she never diagnosed with asthma or COPD. She is not on any chronic bronchodilator therapy or oxygen at home. She said she developed shortness of breath and respiratory distress today she was seen in [facility] urgent care where she was found having significant tachycardia and transferred to [facility] via EMS. In ER she was tachycardic with respiratory alkalosis on ABG. She did not require any supplemental oxygen. CT angio for PE study was negative for PE or other pathology. ABG shows pH 7.70 PCO2 of 18 PO2 98 saturation 99 bicarb 22. She had repeat ABG done today shows pH 7.41 PCO2 35 PO2 82 saturation 96 bicarb 22.7. She denies any cough or sputum production. She denies any chest.  Pain She denies any fever or chills. No nausea vomiting diarrhea abdominal pain          Past Medical History:        Diagnosis Date    Astigmatism     Bronchitis     Cancer ([facility] [address]     bladder    Diabetes mellitus ([facility] [address]     Hypertension     Osteoarthritis        Past Surgical History:        Procedure Laterality Date    ARM SURGERY      BLADDER SURGERY      cancer    HYSTERECTOMY (CERVIX STATUS UNKNOWN)         Social History:     reports that she has never smoked. She does not have any smokeless tobacco history on file. She reports that she does not drink alcohol and does not use drugs. Family History:   History reviewed. No pertinent family history. Allergies:  Patient has no known allergies.         MEDICATIONS during current hospitalization:    Continuous Infusions:   dextrose         Scheduled Meds:   enoxaparin  40 mg SubCUTAneous Daily    insulin lispro  0-4 Units SubCUTAneous TID WC    insulin lispro  0-4 Units SubCUTAneous Nightly       PRN Meds:ipratropium-albuterol, hydrALAZINE, glucose, dextrose bolus **OR** dextrose bolus, glucagon (rDNA), dextrose    REVIEW OF SYSTEMS:  As in history of present illness  Other 14 point review of system is negative. PHYSICAL EXAM:    Vitals:  BP (!) 144/63   Pulse 78   Temp 97.7 °F (36.5 °C) (Oral)   Resp 20   Ht 5' 4\" (1.626 m)   Wt 200 lb (90.7 kg)   SpO2 93%   BMI 34.33 kg/m²   General:   Alert, awake, Oriented x3  .comfortable in bed, No distress. Head: Atraumatic , Normocephalic   Eyes: PERRL. No sclera icterus. No conjunctival injection. No discharge   ENT: No nasal  discharge. Pharynx clear. Neck:  Trachea midline. No thyromegaly, no JVD, No cervical adenopathy. Chest : Bilaterally symmetrical ,Normal effort,  No accessory muscle use  Lung : Diminished BS bilateraly. No Rales. No wheezing. No rhonchi. Heart[de-identified] Normal  rate. Regular rhythm. No mumur ,  Rub or gallop  ABD: Non-tender. Non-distended. No masses. No organmegaly. Normal bowel sounds. No hernia. Extremities: No pitting in both lower leg , No Cyanosis ,No clubbing  Neuro: no cranial nerve abnormality, normal reflex and sensation, no focal weakness   Skin: Warm and dry. No erythema rash on exposed extremities. Data Review  Recent Labs     12/15/22  1745 12/15/22  1904 12/16/22  0425   WBC 5.8  --   --    HGB 9.1* 13.3 13.6   HCT 27.9*  --   --      --   --       Recent Labs     12/15/22  1745 12/15/22  1904 12/16/22  0425     --   --    K 3.5  --   --    CL 99  --   --    CO2 29  --   --    BUN 18  --   --    CREATININE 0.62 0.7 0.6   GLUCOSE 109*  --   --        MV Settings:           ABGs:   Recent Labs     12/15/22  1904 12/16/22  0425   PHART 7.701* 7.414   RTF4ICK 18* 35   PO2ART 98* 82*   NKG7URY 22.0 22.7   BEART 2 -2   F8EOUXPU 99* 96*   PFU2QOY 23 24     O2 Device: Nasal cannula  O2 Flow Rate (L/min): 2 L/min  No results found for: LACTA    Radiology  CT Head W/O Contrast    Result Date: 12/16/2022  EXAMINATION: CT OF THE HEAD WITHOUT CONTRAST  12/15/2022 8:27 pm TECHNIQUE: CT of the head was performed without the administration of intravenous contrast. Automated exposure control, iterative reconstruction, and/or weight based adjustment of the mA/kV was utilized to reduce the radiation dose to as low as reasonably achievable. COMPARISON: 05/25/2015. HISTORY: ORDERING SYSTEM PROVIDED HISTORY: confusion TECHNOLOGIST PROVIDED HISTORY: Reason for exam:->confusion Has a \"code stroke\" or \"stroke alert\" been called? ->No Decision Support Exception - unselect if not a suspected or confirmed emergency medical condition->Emergency Medical Condition (MA) What reading provider will be dictating this exam?->CRC FINDINGS: BRAIN/VENTRICLES: There is no acute intracranial hemorrhage, mass effect or midline shift. No abnormal extra-axial fluid collection. The gray-white differentiation is maintained without evidence of an acute infarct. There is no evidence of hydrocephalus. ORBITS: The visualized portion of the orbits demonstrate no acute abnormality. SINUSES: The visualized paranasal sinuses and mastoid air cells demonstrate no acute abnormality. SOFT TISSUES/SKULL:  No acute abnormality of the visualized skull or soft tissues. No acute intracranial abnormality. CTA CHEST W WO CONTRAST PE Eval    Result Date: 12/16/2022  EXAMINATION: CTA OF THE CHEST WITH AND WITHOUT CONTRAST 12/15/2022 8:27 pm TECHNIQUE: CTA of the chest was performed before and after the administration of intravenous contrast.  Multiplanar reformatted images are provided for review. MIP images are provided for review. Automated exposure control, iterative reconstruction, and/or weight based adjustment of the mA/kV was utilized to reduce the radiation dose to as low as reasonably achievable. COMPARISON: 12/15/2022. HISTORY: ORDERING SYSTEM PROVIDED HISTORY: PE TECHNOLOGIST PROVIDED HISTORY: Reason for exam:->PE Decision Support Exception - unselect if not a suspected or confirmed emergency medical condition->Emergency Medical Condition (MA) What reading provider will be dictating this exam?->CRC FINDINGS: Vessels: Suboptimal opacification of the pulmonary vessels. No evidence of filling defect is visualized in the main pulmonary artery or the main right and left pulmonary arteries, distal to this the vessels are suboptimally evaluated due to suboptimal opacification and streaky artifact would recommend clinical correlation and if clinically indicated repeat study. No evidence of thoracic aortic aneurysm or dissection. No acute abnormality of the aorta. Mediastinum: Subtle mediastinal lymphadenopathy is seen. The heart is slightly prominent in size, the pericardium demonstrates no acute abnormality. Lungs/Pleura: The lungs are without acute process. No focal consolidation or pulmonary edema. No evidence of pleural effusion or pneumothorax. Upper Abdomen: Small type 1 hiatus hernia is seen. Distended gallbladder, no evidence of gallbladder wall thickening or pericholecystic stranding. Limited images of the upper abdomen demonstrates no acute pathology. Soft Tissues/Bones: Degenerative bone changes are seen, no acute bone or soft tissue abnormality. Suboptimal evaluation of the pulmonary vessels cannot rule out distal pulmonary embolism. XR CHEST PORTABLE    Result Date: 12/15/2022  EXAMINATION: ONE XRAY VIEW OF THE CHEST 12/15/2022 6:42 pm COMPARISON: None. HISTORY: ORDERING SYSTEM PROVIDED HISTORY: sob TECHNOLOGIST PROVIDED HISTORY: Reason for exam:->sob What reading provider will be dictating this exam?->CRC FINDINGS: The lungs are without acute focal process. There is no effusion or pneumothorax. The cardiomediastinal silhouette is without acute process. The osseous structures are without acute process.      No acute process. Assessment and  plan:     Tachycardia, resolved  History of chronic bronchitis, no symptoms of bronchitis at this time  Severe respiratory alkalosis? Hyperventilation secondary to anxiety. Anemia  Hypertension  Diabetes mellitus    He has no history of asthma or COPD. She states she was diagnosed with chronic bronchitis in the past.  At this time she does not have any cough or sputum production and doubt any symptoms related to bronchitis. She was tachycardic. ABG shows severe respiratory alkalosis suggestive of hyperventilation. She was not hypoxic PO2 was 98 on room air with PCO2 of 18. Today patient had ABG shows pH 7.41 with PCO2 of 35 and PO2 of 82 with saturation 96%. CT chest shows, no PE or acute process chest x-ray showed no acute process. May consider PFT as outpatient. We will follow    Thank you for consult  NOTE: This report was transcribed using voice recognition software. Every effort was made to ensure accuracy; however, inadvertent computerized transcription errors may be present.     Electronically signed by Padmini Tejada MD, FCCP on 12/16/2022 at 8:38 AM

## 2022-12-16 NOTE — H&P
Hospital Medicine  History and Physical    Patient:  Amado Daniels  MRN: [id number]    CHIEF COMPLAINT:    Chief Complaint   Patient presents with    Shortness of Breath       History Obtained From:  patient, electronic medical record  Primary Care Physician: Vijaya Carr MD    HISTORY OF PRESENT ILLNESS:   The patient is a 67 y.o. female who presents with transient dyspnea, dizziness and lightheadedness. Patient is a 30-year-old female history of bladder cancer in the past currently in remission otherwise has a history of hypertension diabetes and a remote history of \"chronic bronchitis\" which was diagnosed many decades ago. Patient is not on any chronic bronchodilator she is not on home oxygen. Patient states that she developed a sudden onset respiratory distress today. She made an appointment with urgent care and was seen at [facility] urgent care and while there had rather significant tachypnea and was transferred to [facility] via EMS. On arrival she was rather tachypneic with respiratory alkalosis on ABG. She had no hypoxia no hypercapnia did not require any supplemental O2 and by the time of my evaluation at the bedside was that she was back to normal.  Procalcitonin was negative base metabolic panel CBC LFT were likewise benign. Troponin was negative. Patient had a 9.1 hemoglobin in the ED on CBC however ABG was 13.3 which is consistent with her baseline hemoglobin patient has had no black stools bloody stools or hematemesis. CTA chest was negative for any pulmonary embolus or other pathology.     Past Medical History:      Diagnosis Date    Astigmatism     Bronchitis     Cancer ([facility] [address]     bladder    Diabetes mellitus ([facility] [address]     Hypertension     Osteoarthritis        Past Surgical History:      Procedure Laterality Date    ARM SURGERY      BLADDER SURGERY      cancer    HYSTERECTOMY (CERVIX STATUS UNKNOWN)         Medications Prior to Admission:    Prior to Admission medications    Medication Sig Start Date End Date Taking? Authorizing Provider   azithromycin (ZITHROMAX Z-JOSE GUADALUPE) 250 MG tablet Take 1 tablet by mouth daily Take daily for next 4 days, start tomorrow 2/26/17   Brionna Purchase, DO       Allergies:  Patient has no known allergies. Social History:   TOBACCO:   reports that she has never smoked. She does not have any smokeless tobacco history on file. ETOH:   reports no history of alcohol use. OCCUPATION: Owns several rental houses    Family History:   History reviewed. No pertinent family history. REVIEW OF SYSTEMS:  Ten systems reviewed and negative except for as above. Physical Exam:    Vitals: BP (!) 151/69   Pulse 79   Temp 98.2 °F (36.8 °C) (Oral)   Resp 24   Ht 5' 4\" (1.626 m)   Wt 200 lb (90.7 kg)   SpO2 100%   BMI 34.33 kg/m²   Constitutional: alert, appears stated age and cooperative  Skin: Skin color, texture, turgor normal. No rashes or lesions  Eyes:Eye: Normal external eye, conjunctiva, ARISTEO. ENT: Head: Normocephalic, no lesions, without obvious abnormality. Neck: no adenopathy, no carotid bruit, no JVD, supple, symmetrical, trachea midline and thyroid not enlarged, symmetric, no tenderness/mass/nodules  Respiratory: clear to auscultation bilaterally  Cardiovascular: regular rate and rhythm, S1, S2 normal, no murmur, click, rub or gallop  Gastrointestinal: soft, non-tender; bowel sounds normal; no masses,  no organomegaly  Genitourinary: Deferred  Musculoskeletal:extremities normal, atraumatic, no cyanosis or edema  Neurologic: Mental status AAOx3 No facial asymmetry or droop. Normal muscle strength b/l. Psychiatric: Appropriate mood and affect.  Good insight and judgement  Hematologic: No obvious bruising or bleeding    Recent Labs     12/15/22  1745 12/15/22  1904   WBC 5.8  --    HGB 9.1* 13.3     --      Recent Labs     12/15/22  1745 12/15/22  1904     --    K 3.5  --    CL 99  --    CO2 29  --    BUN 18  --    CREATININE 0.62 0.7   GLUCOSE 109*  --    AST 20 --    ALT 17  --    BILITOT 0.3  --    ALKPHOS 108  --      Troponin T:   Recent Labs     12/15/22  1745   TROPONINI <0.010     ABGs:   Lab Results   Component Value Date/Time    PHART 7.701 12/15/2022 07:04 PM    PO2ART 98 12/15/2022 07:04 PM    GZQ1FQM 18 12/15/2022 07:04 PM     INR:   Recent Labs     12/15/22  1845   INR 0.9     URINALYSIS:  Recent Labs     12/15/22  1845   COLORU Yellow   PHUR 8.5   CLARITYU Clear   SPECGRAV 1.006   LEUKOCYTESUR Negative   UROBILINOGEN 0.2   BILIRUBINUR Negative   BLOODU Negative   GLUCOSEU Negative     -----------------------------------------------------------------   XR CHEST PORTABLE    Result Date: 12/15/2022  EXAMINATION: ONE XRAY VIEW OF THE CHEST 12/15/2022 6:42 pm COMPARISON: None. HISTORY: ORDERING SYSTEM PROVIDED HISTORY: sob TECHNOLOGIST PROVIDED HISTORY: Reason for exam:->sob What reading provider will be dictating this exam?->CRC FINDINGS: The lungs are without acute focal process. There is no effusion or pneumothorax. The cardiomediastinal silhouette is without acute process. The osseous structures are without acute process. No acute process. EKG: Normal sinus rhythm    Assessment and Plan   Tachypnea: Resolved patient was offered discharge home with outpatient follow-up with pulmonary but felt more comfortable coming in for monitoring. Consult to pulmonary to establish care no indication right now to initiate any bronchodilators scheduled any steroid administration or antibiotics. Repeat CBC to evaluate for any anemia as a component. Follow CBC BMP daily  Type 2 diabetes: Sliding scale insulin coverage check hemoglobin A1c  Hypertension: As needed hydralazine.   DVT prophylaxis Lovenox    Patient Active Problem List   Diagnosis Code    Tachypnea R06.82       Abby Valdez DO  Admitting Hospitalist    Emergency Contact:

## 2022-12-16 NOTE — CARE COORDINATION
[facility] [place] Case Management Initial Discharge Assessment    Met with Patient to discuss discharge plan. PCP: Yumi Borrero MD                                Date of Last Visit: \"A while ago\", cant remember    [facility] Patient: No        [facility] Notified: no    If no PCP, list provided? N/A    Discharge Planning    Living Arrangements: independently at home    Who do you live with? SPOUSE    Who helps you with your care:  self    If lives at home:     Do you have any barriers navigating in your home? no    Patient can perform ADL? Yes    Current Services (outpatient and in home) :  None    Dialysis: No    Is transportation available to get to your appointments? Yes    DME Equipment:  no    Respiratory equipment: Nebulizer-NEED NEW RX FOR NEB SOLUTION AT DC    Respiratory provider:  no     [facility]  No      Patient agreeable to [facility]? Declined    Patient agreeable to SNF/Rehab? Declined    Other discharge needs identified? N/A    Does Patient Have a High-Risk for Readmission Diagnosis (CHF, PN, MI, COPD)? No      Initial Discharge Plan? (Note: please see concurrent daily documentation for any updates after initial note).     HOME WITH SPOUSE    Readmission Risk              Risk of Unplanned Readmission:  0         Electronically signed by Jeffrey Mock RN on 12/16/2022 at 11:40 AM

## 2022-12-17 LAB
ACINETOBACTER CALCOAC BAUMANNII COMPLEX BY PCR: NOT DETECTED
ANION GAP SERPL CALCULATED.3IONS-SCNC: 10 MEQ/L (ref 9–15)
BACTEROIDES FRAGILIS BY PCR: NOT DETECTED
BASOPHILS ABSOLUTE: 0 K/UL (ref 0–0.2)
BASOPHILS RELATIVE PERCENT: 0.2 %
BLOOD CULTURE, ROUTINE: ABNORMAL
BUN BLDV-MCNC: 22 MG/DL (ref 8–23)
CALCIUM SERPL-MCNC: 9.6 MG/DL (ref 8.5–9.9)
CANDIDA ALBICANS BY PCR: NOT DETECTED
CANDIDA AURIS BY PCR: NOT DETECTED
CANDIDA GLABRATA BY PCR: NOT DETECTED
CANDIDA KRUSEI BY PCR: NOT DETECTED
CANDIDA PARAPSILOSIS BY PCR: NOT DETECTED
CANDIDA TROPICALIS BY PCR: NOT DETECTED
CHLORIDE BLD-SCNC: 104 MEQ/L (ref 95–107)
CO2: 25 MEQ/L (ref 20–31)
CREAT SERPL-MCNC: 0.54 MG/DL (ref 0.5–0.9)
CRYPTOCOCCUS NEOFORMANS/GATTII BY PCR: NOT DETECTED
ENTEROBACTER CLOACAE COMPLEX BY PCR: NOT DETECTED
ENTEROBACTERALES BY PCR: NOT DETECTED
ENTEROCOCCUS FAECALIS BY PCR: NOT DETECTED
ENTEROCOCCUS FAECIUM BY PCR: NOT DETECTED
EOSINOPHILS ABSOLUTE: 0.1 K/UL (ref 0–0.7)
EOSINOPHILS RELATIVE PERCENT: 0.5 %
ESCHERICHIA COLI BY PCR: NOT DETECTED
GFR SERPL CREATININE-BSD FRML MDRD: >60 ML/MIN/{1.73_M2}
GLUCOSE BLD-MCNC: 159 MG/DL (ref 70–99)
GLUCOSE BLD-MCNC: 188 MG/DL (ref 70–99)
GLUCOSE BLD-MCNC: 191 MG/DL (ref 70–99)
GLUCOSE BLD-MCNC: 216 MG/DL (ref 70–99)
GLUCOSE BLD-MCNC: 231 MG/DL (ref 70–99)
HAEMOPHILUS INFLUENZAE BY PCR: NOT DETECTED
HCT VFR BLD CALC: 35.1 % (ref 37–47)
HEMOGLOBIN: 11.5 G/DL (ref 12–16)
KLEBSIELLA AEROGENES BY PCR: NOT DETECTED
KLEBSIELLA OXYTOCA BY PCR: NOT DETECTED
KLEBSIELLA PNEUMONIAE GROUP BY PCR: NOT DETECTED
LISTERIA MONOCYTOGENES BY PCR: NOT DETECTED
LYMPHOCYTES ABSOLUTE: 2 K/UL (ref 1–4.8)
LYMPHOCYTES RELATIVE PERCENT: 18.8 %
MCH RBC QN AUTO: 30.6 PG (ref 27–31.3)
MCHC RBC AUTO-ENTMCNC: 32.8 % (ref 33–37)
MCV RBC AUTO: 93.1 FL (ref 79.4–94.8)
METHICILLIN RESISTANCE MECA/C  BY PCR: NOT DETECTED
MONOCYTES ABSOLUTE: 0.8 K/UL (ref 0.2–0.8)
MONOCYTES RELATIVE PERCENT: 7.4 %
NEISSERIA MENINGITIDIS BY PCR: NOT DETECTED
NEUTROPHILS ABSOLUTE: 7.6 K/UL (ref 1.4–6.5)
NEUTROPHILS RELATIVE PERCENT: 73.1 %
PDW BLD-RTO: 14.4 % (ref 11.5–14.5)
PERFORMED ON: ABNORMAL
PLATELET # BLD: 278 K/UL (ref 130–400)
POTASSIUM REFLEX MAGNESIUM: 4.7 MEQ/L (ref 3.4–4.9)
PROTEUS SPECIES BY PCR: NOT DETECTED
PSEUDOMONAS AERUGINOSA BY PCR: NOT DETECTED
RBC # BLD: 3.77 M/UL (ref 4.2–5.4)
SALMONELLA SPECIES BY PCR: NOT DETECTED
SERRATIA MARCESCENS BY PCR: NOT DETECTED
SODIUM BLD-SCNC: 139 MEQ/L (ref 135–144)
STAPHYLOCOCCUS AUREUS BY PCR: NOT DETECTED
STAPHYLOCOCCUS EPIDERMIDIS BY PCR: DETECTED
STAPHYLOCOCCUS LUGDUNENSIS BY PCR: NOT DETECTED
STAPHYLOCOCCUS SPECIES BY PCR: DETECTED
STENOTROPHOMONAS MALTOPHILIA BY PCR: NOT DETECTED
STREPTOCOCCUS AGALACTIAE BY PCR: NOT DETECTED
STREPTOCOCCUS PNEUMONIAE BY PCR: NOT DETECTED
STREPTOCOCCUS PYOGENES  BY PCR: NOT DETECTED
STREPTOCOCCUS SPECIES BY PCR: NOT DETECTED
WBC # BLD: 10.4 K/UL (ref 4.8–10.8)

## 2022-12-17 PROCEDURE — 2700000000 HC OXYGEN THERAPY PER DAY

## 2022-12-17 PROCEDURE — 96372 THER/PROPH/DIAG INJ SC/IM: CPT

## 2022-12-17 PROCEDURE — 99233 SBSQ HOSP IP/OBS HIGH 50: CPT | Performed by: INTERNAL MEDICINE

## 2022-12-17 PROCEDURE — 94760 N-INVAS EAR/PLS OXIMETRY 1: CPT

## 2022-12-17 PROCEDURE — 85025 COMPLETE CBC W/AUTO DIFF WBC: CPT

## 2022-12-17 PROCEDURE — G0378 HOSPITAL OBSERVATION PER HR: HCPCS

## 2022-12-17 PROCEDURE — 6370000000 HC RX 637 (ALT 250 FOR IP): Performed by: INTERNAL MEDICINE

## 2022-12-17 PROCEDURE — 94761 N-INVAS EAR/PLS OXIMETRY MLT: CPT

## 2022-12-17 PROCEDURE — 6360000002 HC RX W HCPCS: Performed by: INTERNAL MEDICINE

## 2022-12-17 PROCEDURE — 36415 COLL VENOUS BLD VENIPUNCTURE: CPT

## 2022-12-17 PROCEDURE — 94640 AIRWAY INHALATION TREATMENT: CPT

## 2022-12-17 PROCEDURE — 80048 BASIC METABOLIC PNL TOTAL CA: CPT

## 2022-12-17 PROCEDURE — 87077 CULTURE AEROBIC IDENTIFY: CPT

## 2022-12-17 RX ORDER — DOXYCYCLINE HYCLATE 100 MG/1
100 CAPSULE ORAL EVERY 12 HOURS SCHEDULED
Status: DISCONTINUED | OUTPATIENT
Start: 2022-12-17 | End: 2022-12-19 | Stop reason: HOSPADM

## 2022-12-17 RX ORDER — ALBUTEROL SULFATE 2.5 MG/3ML
2.5 SOLUTION RESPIRATORY (INHALATION)
Status: DISCONTINUED | OUTPATIENT
Start: 2022-12-17 | End: 2022-12-19 | Stop reason: HOSPADM

## 2022-12-17 RX ORDER — GUAIFENESIN 600 MG/1
600 TABLET, EXTENDED RELEASE ORAL 2 TIMES DAILY
Status: DISCONTINUED | OUTPATIENT
Start: 2022-12-17 | End: 2022-12-19 | Stop reason: HOSPADM

## 2022-12-17 RX ADMIN — INSULIN LISPRO 1 UNITS: 100 INJECTION, SOLUTION INTRAVENOUS; SUBCUTANEOUS at 11:51

## 2022-12-17 RX ADMIN — INSULIN LISPRO 1 UNITS: 100 INJECTION, SOLUTION INTRAVENOUS; SUBCUTANEOUS at 17:34

## 2022-12-17 RX ADMIN — METFORMIN HYDROCHLORIDE 1000 MG: 500 TABLET ORAL at 07:48

## 2022-12-17 RX ADMIN — ENOXAPARIN SODIUM 40 MG: 100 INJECTION SUBCUTANEOUS at 07:49

## 2022-12-17 RX ADMIN — IPRATROPIUM BROMIDE AND ALBUTEROL SULFATE 1 AMPULE: .5; 2.5 SOLUTION RESPIRATORY (INHALATION) at 09:58

## 2022-12-17 RX ADMIN — DOXYCYCLINE HYCLATE 100 MG: 100 CAPSULE ORAL at 20:51

## 2022-12-17 RX ADMIN — PRAVASTATIN SODIUM 40 MG: 40 TABLET ORAL at 20:51

## 2022-12-17 RX ADMIN — ALBUTEROL SULFATE 2.5 MG: 2.5 SOLUTION RESPIRATORY (INHALATION) at 14:53

## 2022-12-17 RX ADMIN — METFORMIN HYDROCHLORIDE 1000 MG: 500 TABLET ORAL at 20:51

## 2022-12-17 RX ADMIN — GLIPIZIDE 5 MG: 5 TABLET ORAL at 07:48

## 2022-12-17 RX ADMIN — LISINOPRIL 40 MG: 20 TABLET ORAL at 07:48

## 2022-12-17 RX ADMIN — GUAIFENESIN 600 MG: 600 TABLET ORAL at 14:06

## 2022-12-17 RX ADMIN — PREDNISONE 40 MG: 20 TABLET ORAL at 07:48

## 2022-12-17 RX ADMIN — ALBUTEROL SULFATE 2.5 MG: 2.5 SOLUTION RESPIRATORY (INHALATION) at 20:30

## 2022-12-17 RX ADMIN — GUAIFENESIN 600 MG: 600 TABLET ORAL at 20:51

## 2022-12-17 NOTE — PROGRESS NOTES
Assessment complete. Alert and oriented X4. VSS. Is SOB with exertion; lung sounds are diminished. Denies any dizziness, N/V, or pain at this time. Blood sugar at 288; no insulin given per order. Snack provided. Denies any other needs and call light is within reach.   Electronically signed by Elizabeth Cedeno RN on 12/16/2022 at 9:26 PM

## 2022-12-17 NOTE — PROGRESS NOTES
Shift assessment completed and medications given per MAR. VSS and alert and oriented x's 4. No complaint of pain or discomfort at this time. Patient short of breath with a dry cough when Patient changes positions. Glucose reading was 159 insulin not required, order parameters not met. Patient able to make needs known and there are no additional needs at this time. Call light within reach and bed in lowest position. Will continue to monitor. 2307  Patient called said nurse to room and stated that she wanted to get a breathing treatment due to be short of breath. Said nurse listened to Patient's lung sounds and they were clear. Said nurse called respiratory for a PRN breathing treatment @ [phone].    2510  Patient called said nurse back to room and said that she just doesn't feel right. Patient had a x-ray of the chest and CT of head and chest that were all negative. Still waiting on a PRN breathing treatment. Said nurse is reaching out to attending about patient's concerns. 2803  Respiratory is in room with a PRN breathing treatment. 1307  Patient's diet was updated adding a 4 carb controlled diet to existing diet order. Akshat 46  Dr. Mayda Sosa was sent a secure message about Patient's current concerns and about the preliminary blood culture results. Doctor responded @ [phone]. He will be up to assess Patient shortly. 1700  Patient requested a bedside commode and said nurse placed a bedside commode in room.

## 2022-12-17 NOTE — PROGRESS NOTES
Internal Medicine   Hospitalist   Progress Note    2022   6:51 PM    Name:  Yonis Kitchen  MRN:    [id number]     IP Day: 0     Admit Date: 12/15/2022  5:28 PM  PCP: Dangelo Newman MD    Code Status:  Full Code    Assessment and Plan: Active Problems/ diagnosis:     Dyspnea/tachypnea/likely acute bronchitis 2/2 mold exposure  Respiratory alkalosis in the setting of hyperventilation- ? Anxiety  Less likely COPD exacerbation-reported history of chronic bronchitis, no respiratory tests but will need outpatient PFT  HTN  Diabetes-non-insulin-dependent-with hyperglycemia    Plan  We will start the patient on DuoNeb and steroids for today and reassess tomorrow. Pulmonary to see the patient. Sliding scale insulin, hypoglycemia protocol, oral diabetic medication also ordered. Will need outpatient pulmonary follow-up and PFTs  Repeat ABG improved  Presentation not consistent with a pulmonary embolism. Negative troponin and low BNP, imaging not suggestive of CHF. Also presentation not suggestive of CHF  DVT PPx    - bedside commode, patient very sob with activity with long recovery. Cont steroid, neb    7 pm- 7 am, please contact on call Hospitalist for any needs     Subjective:      no new events. Admits to dyspnea with exertion. Physical Examination:      Vitals:  BP (!) 155/66   Pulse 84   Temp 97.6 °F (36.4 °C) (Oral)   Resp 18   Ht 5' 4\" (1.626 m)   Wt 200 lb (90.7 kg)   SpO2 97%   BMI 34.33 kg/m²   Temp (24hrs), Av.5 °F (36.4 °C), Min:97.3 °F (36.3 °C), Max:97.6 °F (36.4 °C)      General appearance: alert, cooperative and no distress  Mental Status: oriented to person, place and time and normal affect  Lungs: clear to auscultation bilaterally, normal effort.   Heart: regular rate and rhythm, no murmur  Abdomen: soft, nontender, nondistended, bowel sounds present, no masses  Extremities: no edema, redness, tenderness in the calves  Skin: no gross lesions, rashes    Data: Labs:  Recent Labs     12/16/22  0756 12/17/22  0503   WBC 7.7 10.4   HGB 12.8 11.5*    278     Recent Labs     12/15/22  1745 12/15/22  1904 12/16/22  0425 12/17/22  0503     --   --  139   K 3.5  --   --  4.7   CL 99  --   --  104   CO2 29  --   --  25   BUN 18  --   --  22   CREATININE 0.62   < > 0.6 0.54   GLUCOSE 109*  --   --  191*    < > = values in this interval not displayed.      Recent Labs     12/15/22  1745   AST 20   ALT 17   BILITOT 0.3   ALKPHOS 108       Current Facility-Administered Medications   Medication Dose Route Frequency Provider Last Rate Last Admin    albuterol (PROVENTIL) nebulizer solution 2.5 mg  2.5 mg Nebulization Q2H PRN Hayder Herrmann MD        albuterol (PROVENTIL) nebulizer solution 2.5 mg  2.5 mg Nebulization Q4H While awake Hayder Herrmann MD   2.5 mg at 12/17/22 1453    guaiFENesin (MUCINEX) extended release tablet 600 mg  600 mg Oral BID Hayder Herrmann MD   600 mg at 12/17/22 1406    doxycycline hyclate (VIBRAMYCIN) capsule 100 mg  100 mg Oral 2 times per day Hayder Herrmann MD        enoxaparin (LOVENOX) injection 40 mg  40 mg SubCUTAneous Daily Negro Vasquez, DO   40 mg at 12/17/22 0749    hydrALAZINE (APRESOLINE) injection 10 mg  10 mg IntraVENous Q4H PRN Micah Blancar, DO        glucose chewable tablet 16 g  4 tablet Oral PRN Negro Blancar, DO        dextrose bolus 10% 125 mL  125 mL IntraVENous PRN Micah Blancar, DO        Or    dextrose bolus 10% 250 mL  250 mL IntraVENous PRN Micah Blancar, DO        glucagon (rDNA) injection 1 mg  1 mg SubCUTAneous PRN Negro Vasquez, DO        dextrose 10 % infusion   IntraVENous Continuous PRN Micah Blancar, DO        insulin lispro (HUMALOG) injection vial 0-4 Units  0-4 Units SubCUTAneous TID  Micah Blancar, DO   1 Units at 12/17/22 1734    insulin lispro (HUMALOG) injection vial 0-4 Units  0-4 Units SubCUTAneous Nightly Kirit Vasquez DO        lisinopril (PRINIVIL;ZESTRIL) tablet 40 mg  40 mg Oral Daily Brandon CROWLEY Pedro, DO   40 mg at 12/17/22 0748    metFORMIN (GLUCOPHAGE) tablet 1,000 mg  1,000 mg Oral BID Barbara Field, DO   1,000 mg at 12/17/22 0748    pravastatin (PRAVACHOL) tablet 40 mg  40 mg Oral Nightly Sharrell Field, DO   40 mg at 12/16/22 2126    glipiZIDE (GLUCOTROL) tablet 5 mg  5 mg Oral QAM AC Yazid R Pedro, DO   5 mg at 12/17/22 0748    predniSONE (DELTASONE) tablet 40 mg  40 mg Oral Daily Tobyrell Field, DO   40 mg at 12/17/22 6272       Additional work up or/and treatment plan may be added today or then after based on clinical progression. I am managing a portion of pt care. Some medical issues are handled by other specialists. Additional work up and treatment should be done in out pt setting by pt PCP and other out pt providers. In addition to examining and evaluating pt, I spent additional time explaining care, normaland abnormal findings, and treatment plan. All of pt questions were answered. Counseling, diet and education were provided. Case will be discussed with nursing staff when appropriate. Family will be updated if and when appropriate.        Electronically signed by Sharda Rodríguez MD on 12/17/2022 at 6:51 PM

## 2022-12-17 NOTE — PROGRESS NOTES
INPATIENT PROGRESS NOTES    PATIENT NAME: Dena Lynn  MRN: [id number]  SERVICE DATE:  December 17, 2022   SERVICE TIME:  11:35 AM      PRIMARY SERVICE: Pulmonary Disease    CHIEF COMPLAIN: Shortness of breath      INTERVAL HPI: Patient seen and examined at bedside, Interval Notes, orders reviewed. Nursing notes noted  Patient's family at bedside. She is having wheezing. She is having short of breath and family is asking about vest therapy, bronchodilators and steroid which helped for breathing in the past.  She is on 2 L O2 via nasal cannula O2 saturation is 96%. No fever or chills. Blood culture 1 out of 2 staph, identification is pending. Family wants to transfer to [facility]. OBJECTIVE    Body mass index is 34.33 kg/m². PHYSICAL EXAM:  Vitals:  /60   Pulse 76   Temp 97.6 °F (36.4 °C) (Oral)   Resp 23   Ht 5' 4\" (1.626 m)   Wt 200 lb (90.7 kg)   SpO2 96%   BMI 34.33 kg/m²   General: Alert, awake . comfortable in bed, No distress. Head: Atraumatic , Normocephalic   Eyes: PERRL. No sclera icterus. No conjunctival injection. No discharge   ENT: No nasal  discharge. Pharynx clear. Neck:  Trachea midline. No thyromegaly, no JVD, No cervical adenopathy. Chest : Bilaterally symmetrical ,Normal effort,  No accessory muscle use  Lung : . Fair BS bilateral, decreased BS at bases. No Rales. , bilateral scattered wheezing more in upper airway. No rhonchi. Heart[de-identified] Normal  rate. Regular rhythm. No mumur ,  Rub or gallop  ABD: Non-tender. Non-distended. No masses. No organmegaly. Normal bowel sounds. No hernia.   Ext : No Pitting both leg , No Cyanosis No clubbing  Neuro: no focal weakness          DATA:   Recent Labs     12/16/22  0756 12/17/22  0503   WBC 7.7 10.4   HGB 12.8 11.5*   HCT 38.6 35.1*   MCV 91.3 93.1    278     Recent Labs     12/15/22  1745 12/15/22  1904 12/16/22  0425 12/17/22  0503     --   --  139   K 3.5  --   --  4.7   CL 99  --   --  104   CO2 29  --   --  25 BUN 18  --   --  22   CREATININE 0.62   < > 0.6 0.54   GLUCOSE 109*  --   --  191*   CALCIUM 10.0*  --   --  9.6   PROT 6.8  --   --   --    LABALBU 4.3  --   --   --    BILITOT 0.3  --   --   --    ALKPHOS 108  --   --   --    AST 20  --   --   --    ALT 17  --   --   --    LABGLOM >60.0   < > >60 >60.0   GLOB 2.5  --   --   --     < > = values in this interval not displayed. MV Settings:          Recent Labs     12/16/22  0425   PHART 7.414   MGK3OME 35   PO2ART 82*   LHF1PFS 22.7   BEART -2   V5CXKCQH 96*       O2 Device: Nasal cannula  O2 Flow Rate (L/min): 2 L/min    ADULT DIET; Regular; Low Fat/Low Chol/High Fiber/2 gm Na; Low Sodium (2 gm)     MEDICATIONS during current hospitalization:    Continuous Infusions:   dextrose         Scheduled Meds:   albuterol  2.5 mg Nebulization Q4H While awake    guaiFENesin  600 mg Oral BID    doxycycline  100 mg Oral 2 times per day    enoxaparin  40 mg SubCUTAneous Daily    insulin lispro  0-4 Units SubCUTAneous TID WC    insulin lispro  0-4 Units SubCUTAneous Nightly    lisinopril  40 mg Oral Daily    metFORMIN  1,000 mg Oral BID    pravastatin  40 mg Oral Nightly    glipiZIDE  5 mg Oral QAM AC    predniSONE  40 mg Oral Daily       PRN Meds:albuterol, ipratropium-albuterol, hydrALAZINE, glucose, dextrose bolus **OR** dextrose bolus, glucagon (rDNA), dextrose    Radiology  CT Head W/O Contrast    Result Date: 12/16/2022  EXAMINATION: CT OF THE HEAD WITHOUT CONTRAST  12/15/2022 8:27 pm TECHNIQUE: CT of the head was performed without the administration of intravenous contrast. Automated exposure control, iterative reconstruction, and/or weight based adjustment of the mA/kV was utilized to reduce the radiation dose to as low as reasonably achievable. COMPARISON: 05/25/2015. HISTORY: ORDERING SYSTEM PROVIDED HISTORY: confusion TECHNOLOGIST PROVIDED HISTORY: Reason for exam:->confusion Has a \"code stroke\" or \"stroke alert\" been called? ->No Decision Support Exception - unselect if not a suspected or confirmed emergency medical condition->Emergency Medical Condition (MA) What reading provider will be dictating this exam?->CRC FINDINGS: BRAIN/VENTRICLES: There is no acute intracranial hemorrhage, mass effect or midline shift. No abnormal extra-axial fluid collection. The gray-white differentiation is maintained without evidence of an acute infarct. There is no evidence of hydrocephalus. ORBITS: The visualized portion of the orbits demonstrate no acute abnormality. SINUSES: The visualized paranasal sinuses and mastoid air cells demonstrate no acute abnormality. SOFT TISSUES/SKULL:  No acute abnormality of the visualized skull or soft tissues. No acute intracranial abnormality. CTA CHEST W WO CONTRAST PE Eval    Result Date: 12/16/2022  EXAMINATION: CTA OF THE CHEST WITH AND WITHOUT CONTRAST 12/15/2022 8:27 pm TECHNIQUE: CTA of the chest was performed before and after the administration of intravenous contrast.  Multiplanar reformatted images are provided for review. MIP images are provided for review. Automated exposure control, iterative reconstruction, and/or weight based adjustment of the mA/kV was utilized to reduce the radiation dose to as low as reasonably achievable. COMPARISON: 12/15/2022. HISTORY: ORDERING SYSTEM PROVIDED HISTORY: PE TECHNOLOGIST PROVIDED HISTORY: Reason for exam:->PE Decision Support Exception - unselect if not a suspected or confirmed emergency medical condition->Emergency Medical Condition (MA) What reading provider will be dictating this exam?->CRC FINDINGS: Vessels: Suboptimal opacification of the pulmonary vessels. No evidence of filling defect is visualized in the main pulmonary artery or the main right and left pulmonary arteries, distal to this the vessels are suboptimally evaluated due to suboptimal opacification and streaky artifact would recommend clinical correlation and if clinically indicated repeat study.  No evidence of thoracic aortic aneurysm or dissection. No acute abnormality of the aorta. Mediastinum: Subtle mediastinal lymphadenopathy is seen. The heart is slightly prominent in size, the pericardium demonstrates no acute abnormality. Lungs/Pleura: The lungs are without acute process. No focal consolidation or pulmonary edema. No evidence of pleural effusion or pneumothorax. Upper Abdomen: Small type 1 hiatus hernia is seen. Distended gallbladder, no evidence of gallbladder wall thickening or pericholecystic stranding. Limited images of the upper abdomen demonstrates no acute pathology. Soft Tissues/Bones: Degenerative bone changes are seen, no acute bone or soft tissue abnormality. Suboptimal evaluation of the pulmonary vessels cannot rule out distal pulmonary embolism. XR CHEST PORTABLE    Result Date: 12/15/2022  EXAMINATION: ONE XRAY VIEW OF THE CHEST 12/15/2022 6:42 pm COMPARISON: None. HISTORY: ORDERING SYSTEM PROVIDED HISTORY: sob TECHNOLOGIST PROVIDED HISTORY: Reason for exam:->sob What reading provider will be dictating this exam?->CRC FINDINGS: The lungs are without acute focal process. There is no effusion or pneumothorax. The cardiomediastinal silhouette is without acute process. The osseous structures are without acute process. No acute process. IMPRESSION AND SUGGESTION:  Chronic bronchitis  Possible reactive airway disease with recurrent bronchospasm  Anemia  Hypertension  Diabetes mellitus    Discussed with family at bedside. We will give her vest therapy to help her expectorate mucus. Add Mucinex 600 mg twice daily. P.o. doxycycline 100 mg twice daily. Prednisone 40 mg daily. Nebulizer with albuterol every 4 hours while awake continue to hold as needed. 1 out of 2 blood culture positive for staph identification is pending. She is afebrile WBC is within normal range. NOTE: This report was transcribed using voice recognition software.  Every effort was made to ensure accuracy; however, inadvertent computerized transcription errors may be present.       Electronically signed by Mckinley Bourgeois MD, FCCP on 12/17/2022 at 11:35 AM

## 2022-12-18 LAB
ANION GAP SERPL CALCULATED.3IONS-SCNC: 17 MEQ/L (ref 9–15)
BASOPHILS ABSOLUTE: 0 K/UL (ref 0–0.2)
BASOPHILS RELATIVE PERCENT: 0.5 %
BUN BLDV-MCNC: 19 MG/DL (ref 8–23)
CALCIUM SERPL-MCNC: 9.5 MG/DL (ref 8.5–9.9)
CHLORIDE BLD-SCNC: 105 MEQ/L (ref 95–107)
CO2: 23 MEQ/L (ref 20–31)
CREAT SERPL-MCNC: 0.53 MG/DL (ref 0.5–0.9)
EOSINOPHILS ABSOLUTE: 0.1 K/UL (ref 0–0.7)
EOSINOPHILS RELATIVE PERCENT: 1.2 %
GFR SERPL CREATININE-BSD FRML MDRD: >60 ML/MIN/{1.73_M2}
GLUCOSE BLD-MCNC: 156 MG/DL (ref 70–99)
GLUCOSE BLD-MCNC: 166 MG/DL (ref 70–99)
GLUCOSE BLD-MCNC: 170 MG/DL (ref 70–99)
GLUCOSE BLD-MCNC: 267 MG/DL (ref 70–99)
HCT VFR BLD CALC: 35.4 % (ref 37–47)
HEMOGLOBIN: 11.6 G/DL (ref 12–16)
LYMPHOCYTES ABSOLUTE: 3.4 K/UL (ref 1–4.8)
LYMPHOCYTES RELATIVE PERCENT: 37.7 %
MCH RBC QN AUTO: 30.2 PG (ref 27–31.3)
MCHC RBC AUTO-ENTMCNC: 32.6 % (ref 33–37)
MCV RBC AUTO: 92.4 FL (ref 79.4–94.8)
MONOCYTES ABSOLUTE: 0.6 K/UL (ref 0.2–0.8)
MONOCYTES RELATIVE PERCENT: 6.5 %
NEUTROPHILS ABSOLUTE: 4.9 K/UL (ref 1.4–6.5)
NEUTROPHILS RELATIVE PERCENT: 54.1 %
PDW BLD-RTO: 14.4 % (ref 11.5–14.5)
PERFORMED ON: ABNORMAL
PLATELET # BLD: 263 K/UL (ref 130–400)
POTASSIUM REFLEX MAGNESIUM: 4.3 MEQ/L (ref 3.4–4.9)
RBC # BLD: 3.84 M/UL (ref 4.2–5.4)
SODIUM BLD-SCNC: 145 MEQ/L (ref 135–144)
WBC # BLD: 9 K/UL (ref 4.8–10.8)

## 2022-12-18 PROCEDURE — 85025 COMPLETE CBC W/AUTO DIFF WBC: CPT

## 2022-12-18 PROCEDURE — 36415 COLL VENOUS BLD VENIPUNCTURE: CPT

## 2022-12-18 PROCEDURE — 96372 THER/PROPH/DIAG INJ SC/IM: CPT

## 2022-12-18 PROCEDURE — G0378 HOSPITAL OBSERVATION PER HR: HCPCS

## 2022-12-18 PROCEDURE — 6370000000 HC RX 637 (ALT 250 FOR IP): Performed by: INTERNAL MEDICINE

## 2022-12-18 PROCEDURE — 94640 AIRWAY INHALATION TREATMENT: CPT

## 2022-12-18 PROCEDURE — 94761 N-INVAS EAR/PLS OXIMETRY MLT: CPT

## 2022-12-18 PROCEDURE — 2700000000 HC OXYGEN THERAPY PER DAY

## 2022-12-18 PROCEDURE — 6360000002 HC RX W HCPCS: Performed by: INTERNAL MEDICINE

## 2022-12-18 PROCEDURE — 99232 SBSQ HOSP IP/OBS MODERATE 35: CPT | Performed by: INTERNAL MEDICINE

## 2022-12-18 PROCEDURE — 80048 BASIC METABOLIC PNL TOTAL CA: CPT

## 2022-12-18 PROCEDURE — 94668 MNPJ CHEST WALL SBSQ: CPT

## 2022-12-18 RX ADMIN — ALBUTEROL SULFATE 2.5 MG: 2.5 SOLUTION RESPIRATORY (INHALATION) at 15:18

## 2022-12-18 RX ADMIN — METFORMIN HYDROCHLORIDE 1000 MG: 500 TABLET ORAL at 07:55

## 2022-12-18 RX ADMIN — PRAVASTATIN SODIUM 40 MG: 40 TABLET ORAL at 20:38

## 2022-12-18 RX ADMIN — ENOXAPARIN SODIUM 40 MG: 100 INJECTION SUBCUTANEOUS at 07:55

## 2022-12-18 RX ADMIN — GUAIFENESIN 600 MG: 600 TABLET ORAL at 07:56

## 2022-12-18 RX ADMIN — ALBUTEROL SULFATE 2.5 MG: 2.5 SOLUTION RESPIRATORY (INHALATION) at 20:19

## 2022-12-18 RX ADMIN — GUAIFENESIN 600 MG: 600 TABLET ORAL at 20:38

## 2022-12-18 RX ADMIN — DOXYCYCLINE HYCLATE 100 MG: 100 CAPSULE ORAL at 20:38

## 2022-12-18 RX ADMIN — INSULIN LISPRO 2 UNITS: 100 INJECTION, SOLUTION INTRAVENOUS; SUBCUTANEOUS at 17:07

## 2022-12-18 RX ADMIN — GLIPIZIDE 5 MG: 5 TABLET ORAL at 07:56

## 2022-12-18 RX ADMIN — PREDNISONE 40 MG: 20 TABLET ORAL at 07:56

## 2022-12-18 RX ADMIN — LISINOPRIL 40 MG: 20 TABLET ORAL at 07:56

## 2022-12-18 RX ADMIN — ALBUTEROL SULFATE 2.5 MG: 2.5 SOLUTION RESPIRATORY (INHALATION) at 11:16

## 2022-12-18 RX ADMIN — METFORMIN HYDROCHLORIDE 1000 MG: 500 TABLET ORAL at 17:09

## 2022-12-18 RX ADMIN — ALBUTEROL SULFATE 2.5 MG: 2.5 SOLUTION RESPIRATORY (INHALATION) at 08:25

## 2022-12-18 RX ADMIN — DOXYCYCLINE HYCLATE 100 MG: 100 CAPSULE ORAL at 07:56

## 2022-12-18 NOTE — PROGRESS NOTES
Shift assessment completed and medications given per MAR. Patient at all of breakfast prior to PCA getting glucose reading, insulin was not given. VSS, lungs diminished with wheezing bilateral. Short of breath with position changes and ambulation. Patient is alert and oriented x's 4 with no complaints of pain or discomfort at this time. Call light is within reach and bed is in lowest position. Patient has no additional needs at this time. Will continue to monitor.

## 2022-12-18 NOTE — PROGRESS NOTES
Assessment completed. Pt resting in bed. Lungs diminished with wheezes. Pt SOB with exertion. Up to the bedside commode with stand by assist.  Vitals stable. Meds given per orders. Frequent checks to room call light in reach.

## 2022-12-18 NOTE — PROGRESS NOTES
Internal Medicine   Hospitalist   Progress Note    2022   12:23 PM    Name:  Kanika Leslie  MRN:    [id number]     IP Day: 0     Admit Date: 12/15/2022  5:28 PM  PCP: Key Alvarez MD    Code Status:  Full Code    Assessment and Plan: Active Problems/ diagnosis:     Dyspnea/tachypnea/likely acute bronchitis 2/2 mold exposure  Respiratory alkalosis in the setting of hyperventilation- ? Anxiety  Less likely COPD exacerbation-reported history of chronic bronchitis, no respiratory tests but will need outpatient PFT  HTN  Diabetes-non-insulin-dependent-with hyperglycemia    Plan  Resume DuoNeb and steroids for today and reassess tomorrow. Pulmonary to see the patient. Sliding scale insulin, hypoglycemia protocol, oral diabetic medication also ordered. Will need outpatient pulmonary follow-up and PFTs  Repeat ABG improved  Presentation not consistent with a pulmonary embolism. Negative troponin and low BNP, imaging not suggestive of CHF. Also presentation not suggestive of CHF  DVT PPx     7 pm- 7 am, please contact on call Hospitalist for any needs     Dispo- ok to dc once ok by pulm    Subjective:      no new events. Has dyspnea with exertion. Physical Examination:      Vitals:  BP (!) 141/66   Pulse 67   Temp 97.3 °F (36.3 °C) (Oral)   Resp 18   Ht 5' 4\" (1.626 m)   Wt 200 lb (90.7 kg)   SpO2 100%   BMI 34.33 kg/m²   Temp (24hrs), Av.3 °F (36.3 °C), Min:97.3 °F (36.3 °C), Max:97.3 °F (36.3 °C)      General appearance: alert, cooperative and no distress  Mental Status: oriented to person, place and time and normal affect  Lungs: clear to auscultation bilaterally, normal effort.   Heart: regular rate and rhythm, no murmur  Abdomen: soft, nontender, nondistended, bowel sounds present, no masses  Extremities: no edema, redness, tenderness in the calves  Skin: no gross lesions, rashes    Data:     Labs:  Recent Labs     22  0503 22  0608   WBC 10.4 9.0   HGB 11.5* 11.6*   PLT 278 263     Recent Labs     12/17/22  0503 12/18/22  0608    145*   K 4.7 4.3    105   CO2 25 23   BUN 22 19   CREATININE 0.54 0.53   GLUCOSE 191* 166*     Recent Labs     12/15/22  1745   AST 20   ALT 17   BILITOT 0.3   ALKPHOS 108       Current Facility-Administered Medications   Medication Dose Route Frequency Provider Last Rate Last Admin    albuterol (PROVENTIL) nebulizer solution 2.5 mg  2.5 mg Nebulization Q2H PRN Hayder Herrmann MD        albuterol (PROVENTIL) nebulizer solution 2.5 mg  2.5 mg Nebulization Q4H While awake Hayder Herrmann MD   2.5 mg at 12/18/22 1116    guaiFENesin (MUCINEX) extended release tablet 600 mg  600 mg Oral BID Hayder Herrmann MD   600 mg at 12/18/22 0756    doxycycline hyclate (VIBRAMYCIN) capsule 100 mg  100 mg Oral 2 times per day Hayder Herrmann MD   100 mg at 12/18/22 0756    enoxaparin (LOVENOX) injection 40 mg  40 mg SubCUTAneous Daily Micah Prasad Sedar, DO   40 mg at 12/18/22 0755    hydrALAZINE (APRESOLINE) injection 10 mg  10 mg IntraVENous Q4H PRN Micah Prasad Sedar, DO        glucose chewable tablet 16 g  4 tablet Oral PRN Negro NEWTON Sedar, DO        dextrose bolus 10% 125 mL  125 mL IntraVENous PRN Micah Prasad Sedar, DO        Or    dextrose bolus 10% 250 mL  250 mL IntraVENous PRN Micah Prasad Sedar, DO        glucagon (rDNA) injection 1 mg  1 mg SubCUTAneous PRN Negro NEWTON Sedar, DO        dextrose 10 % infusion   IntraVENous Continuous PRN Micah Prasad Sedar, DO        insulin lispro (HUMALOG) injection vial 0-4 Units  0-4 Units SubCUTAneous TID WC Micah Prasad Sedar, DO   1 Units at 12/17/22 1734    insulin lispro (HUMALOG) injection vial 0-4 Units  0-4 Units SubCUTAneous Nightly Negro NEWTON Sedar, DO        lisinopril (PRINIVIL;ZESTRIL) tablet 40 mg  40 mg Oral Daily Eric Holliday, DO   40 mg at 12/18/22 0756    metFORMIN (GLUCOPHAGE) tablet 1,000 mg  1,000 mg Oral BID Eric Mg, DO   1,000 mg at 12/18/22 0755    pravastatin (PRAVACHOL) tablet 40 mg  40 mg Oral Nightly Pitney Mg, DO   40 mg at 12/17/22 2051    glipiZIDE (GLUCOTROL) tablet 5 mg  5 mg Oral QAM AC Yazid R Pedro, DO   5 mg at 12/18/22 0756    predniSONE (DELTASONE) tablet 40 mg  40 mg Oral Daily Eric Holliday DO   40 mg at 12/18/22 3567       Additional work up or/and treatment plan may be added today or then after based on clinical progression. I am managing a portion of pt care. Some medical issues are handled by other specialists. Additional work up and treatment should be done in out pt setting by pt PCP and other out pt providers. In addition to examining and evaluating pt, I spent additional time explaining care, normaland abnormal findings, and treatment plan. All of pt questions were answered. Counseling, diet and education were provided. Case will be discussed with nursing staff when appropriate. Family will be updated if and when appropriate.        Electronically signed by Eric Holliday DO on 12/18/2022 at 12:23 PM

## 2022-12-18 NOTE — PROGRESS NOTES
INPATIENT PROGRESS NOTES    PATIENT NAME: Kanika Leslie  MRN: [id number]  SERVICE DATE:  December 18, 2022   SERVICE TIME:  3:15 PM      PRIMARY SERVICE: Pulmonary Disease    CHIEF COMPLAIN: Shortness of breath      INTERVAL HPI: Patient seen and examined at bedside, Interval Notes, orders reviewed. Nursing notes noted  She is overall feeling better but still wheezing. She is on 2 L O2 via nasal cannula O2 saturation is 100 %. No fever or chills. No chest pain. No fever or chills. No nausea vomiting diarrhea. OBJECTIVE    Body mass index is 34.33 kg/m². PHYSICAL EXAM:  Vitals:  BP (!) 141/66   Pulse 67   Temp 97.3 °F (36.3 °C) (Oral)   Resp 18   Ht 5' 4\" (1.626 m)   Wt 200 lb (90.7 kg)   SpO2 100%   BMI 34.33 kg/m²   General: Alert, awake . comfortable in bed, No distress. Head: Atraumatic , Normocephalic   Eyes: PERRL. No sclera icterus. No conjunctival injection. No discharge   ENT: No nasal  discharge. Pharynx clear. Neck:  Trachea midline. No thyromegaly, no JVD, No cervical adenopathy. Chest : Bilaterally symmetrical ,Normal effort,  No accessory muscle use  Lung : . Fair BS bilateral, decreased BS at bases. No Rales. , bilateral scattered wheezing more in upper airway. No rhonchi. Heart[de-identified] Normal  rate. Regular rhythm. No mumur ,  Rub or gallop  ABD: Non-tender. Non-distended. No masses. No organmegaly. Normal bowel sounds. No hernia.   Ext : No Pitting both leg , No Cyanosis No clubbing  Neuro: no focal weakness          DATA:   Recent Labs     12/17/22  0503 12/18/22  0608   WBC 10.4 9.0   HGB 11.5* 11.6*   HCT 35.1* 35.4*   MCV 93.1 92.4    263     Recent Labs     12/15/22  1745 12/15/22  1904 12/17/22  0503 12/18/22  0608     --  139 145*   K 3.5  --  4.7 4.3   CL 99  --  104 105   CO2 29  --  25 23   BUN 18  --  22 19   CREATININE 0.62   < > 0.54 0.53   GLUCOSE 109*  --  191* 166*   CALCIUM 10.0*  --  9.6 9.5   PROT 6.8  --   --   --    LABALBU 4.3  --   --   --    BILITOT 0.3  --   --   --    ALKPHOS 108  --   --   --    AST 20  --   --   --    ALT 17  --   --   --    LABGLOM >60.0   < > >60.0 >60.0   GLOB 2.5  --   --   --     < > = values in this interval not displayed. MV Settings:          Recent Labs     12/16/22  0425   PHART 7.414   VLO7NVD 35   PO2ART 82*   HPF8YIU 22.7   BEART -2   N4QSZMBU 96*       O2 Device: Nasal cannula  O2 Flow Rate (L/min): 3 L/min    ADULT DIET; Regular; 4 carb choices (60 gm/meal); Low Fat/Low Chol/High Fiber/2 gm Na; Low Sodium (2 gm)     MEDICATIONS during current hospitalization:    Continuous Infusions:   dextrose         Scheduled Meds:   albuterol  2.5 mg Nebulization Q4H While awake    guaiFENesin  600 mg Oral BID    doxycycline  100 mg Oral 2 times per day    enoxaparin  40 mg SubCUTAneous Daily    insulin lispro  0-4 Units SubCUTAneous TID WC    insulin lispro  0-4 Units SubCUTAneous Nightly    lisinopril  40 mg Oral Daily    metFORMIN  1,000 mg Oral BID    pravastatin  40 mg Oral Nightly    glipiZIDE  5 mg Oral QAM AC    predniSONE  40 mg Oral Daily       PRN Meds:albuterol, hydrALAZINE, glucose, dextrose bolus **OR** dextrose bolus, glucagon (rDNA), dextrose    Radiology  CT Head W/O Contrast    Result Date: 12/16/2022  EXAMINATION: CT OF THE HEAD WITHOUT CONTRAST  12/15/2022 8:27 pm TECHNIQUE: CT of the head was performed without the administration of intravenous contrast. Automated exposure control, iterative reconstruction, and/or weight based adjustment of the mA/kV was utilized to reduce the radiation dose to as low as reasonably achievable. COMPARISON: 05/25/2015. HISTORY: ORDERING SYSTEM PROVIDED HISTORY: confusion TECHNOLOGIST PROVIDED HISTORY: Reason for exam:->confusion Has a \"code stroke\" or \"stroke alert\" been called? ->No Decision Support Exception - unselect if not a suspected or confirmed emergency medical condition->Emergency Medical Condition (MA) What reading provider will be dictating this exam?->CRC FINDINGS: BRAIN/VENTRICLES: There is no acute intracranial hemorrhage, mass effect or midline shift. No abnormal extra-axial fluid collection. The gray-white differentiation is maintained without evidence of an acute infarct. There is no evidence of hydrocephalus. ORBITS: The visualized portion of the orbits demonstrate no acute abnormality. SINUSES: The visualized paranasal sinuses and mastoid air cells demonstrate no acute abnormality. SOFT TISSUES/SKULL:  No acute abnormality of the visualized skull or soft tissues. No acute intracranial abnormality. CTA CHEST W WO CONTRAST PE Eval    Result Date: 12/16/2022  EXAMINATION: CTA OF THE CHEST WITH AND WITHOUT CONTRAST 12/15/2022 8:27 pm TECHNIQUE: CTA of the chest was performed before and after the administration of intravenous contrast.  Multiplanar reformatted images are provided for review. MIP images are provided for review. Automated exposure control, iterative reconstruction, and/or weight based adjustment of the mA/kV was utilized to reduce the radiation dose to as low as reasonably achievable. COMPARISON: 12/15/2022. HISTORY: ORDERING SYSTEM PROVIDED HISTORY: PE TECHNOLOGIST PROVIDED HISTORY: Reason for exam:->PE Decision Support Exception - unselect if not a suspected or confirmed emergency medical condition->Emergency Medical Condition (MA) What reading provider will be dictating this exam?->CRC FINDINGS: Vessels: Suboptimal opacification of the pulmonary vessels. No evidence of filling defect is visualized in the main pulmonary artery or the main right and left pulmonary arteries, distal to this the vessels are suboptimally evaluated due to suboptimal opacification and streaky artifact would recommend clinical correlation and if clinically indicated repeat study. No evidence of thoracic aortic aneurysm or dissection. No acute abnormality of the aorta. Mediastinum: Subtle mediastinal lymphadenopathy is seen.   The heart is slightly prominent in size, the pericardium demonstrates no acute abnormality. Lungs/Pleura: The lungs are without acute process. No focal consolidation or pulmonary edema. No evidence of pleural effusion or pneumothorax. Upper Abdomen: Small type 1 hiatus hernia is seen. Distended gallbladder, no evidence of gallbladder wall thickening or pericholecystic stranding. Limited images of the upper abdomen demonstrates no acute pathology. Soft Tissues/Bones: Degenerative bone changes are seen, no acute bone or soft tissue abnormality. Suboptimal evaluation of the pulmonary vessels cannot rule out distal pulmonary embolism. XR CHEST PORTABLE    Result Date: 12/15/2022  EXAMINATION: ONE XRAY VIEW OF THE CHEST 12/15/2022 6:42 pm COMPARISON: None. HISTORY: ORDERING SYSTEM PROVIDED HISTORY: sob TECHNOLOGIST PROVIDED HISTORY: Reason for exam:->sob What reading provider will be dictating this exam?->CRC FINDINGS: The lungs are without acute focal process. There is no effusion or pneumothorax. The cardiomediastinal silhouette is without acute process. The osseous structures are without acute process. No acute process. IMPRESSION AND SUGGESTION:  Chronic bronchitis  Possible reactive airway disease with recurrent bronchospasm  Anemia  Hypertension  Diabetes mellitus    Patient is improving slowly. On vest therapy. Mucinex 600 mg twice daily. P.o. doxycycline 100 mg twice daily. Prednisone 40 mg daily. Nebulizer with albuterol every 4 hours while awake. Discussed with family likely discharge tomorrow. She will need refill on albuterol nebulizer when discharged. Follow-up with primary care physician at [facility] [facility] clinic and recommend to  follow-up with pulmonologist with [facility] for continuity of care,     NOTE: This report was transcribed using voice recognition software. Every effort was made to ensure accuracy; however, inadvertent computerized transcription errors may be present.       Electronically signed by Tara Lima MD,

## 2022-12-19 VITALS
TEMPERATURE: 97.4 F | HEART RATE: 75 BPM | OXYGEN SATURATION: 96 % | SYSTOLIC BLOOD PRESSURE: 134 MMHG | DIASTOLIC BLOOD PRESSURE: 58 MMHG | HEIGHT: 64 IN | BODY MASS INDEX: 34.15 KG/M2 | WEIGHT: 200 LBS | RESPIRATION RATE: 20 BRPM

## 2022-12-19 PROBLEM — J44.1 COPD EXACERBATION (HCC): Status: ACTIVE | Noted: 2022-12-19

## 2022-12-19 LAB
ANION GAP SERPL CALCULATED.3IONS-SCNC: 9 MEQ/L (ref 9–15)
BASOPHILS ABSOLUTE: 0 K/UL (ref 0–0.2)
BASOPHILS RELATIVE PERCENT: 0.4 %
BUN BLDV-MCNC: 22 MG/DL (ref 8–23)
CALCIUM SERPL-MCNC: 9.5 MG/DL (ref 8.5–9.9)
CHLORIDE BLD-SCNC: 104 MEQ/L (ref 95–107)
CO2: 29 MEQ/L (ref 20–31)
CREAT SERPL-MCNC: 0.5 MG/DL (ref 0.5–0.9)
CULTURE, BLOOD ID SENSITIVITY: ABNORMAL
CULTURE, BLOOD ID SENSITIVITY: ABNORMAL
EOSINOPHILS ABSOLUTE: 0.1 K/UL (ref 0–0.7)
EOSINOPHILS RELATIVE PERCENT: 0.8 %
GFR SERPL CREATININE-BSD FRML MDRD: >60 ML/MIN/{1.73_M2}
GLUCOSE BLD-MCNC: 149 MG/DL (ref 70–99)
GLUCOSE BLD-MCNC: 153 MG/DL (ref 70–99)
GLUCOSE BLD-MCNC: 160 MG/DL (ref 70–99)
GLUCOSE BLD-MCNC: 199 MG/DL (ref 70–99)
GLUCOSE BLD-MCNC: 251 MG/DL (ref 70–99)
HCT VFR BLD CALC: 36.2 % (ref 37–47)
HEMOGLOBIN: 11.9 G/DL (ref 12–16)
LYMPHOCYTES ABSOLUTE: 3.6 K/UL (ref 1–4.8)
LYMPHOCYTES RELATIVE PERCENT: 37.7 %
MCH RBC QN AUTO: 30.1 PG (ref 27–31.3)
MCHC RBC AUTO-ENTMCNC: 32.9 % (ref 33–37)
MCV RBC AUTO: 91.5 FL (ref 79.4–94.8)
MONOCYTES ABSOLUTE: 0.6 K/UL (ref 0.2–0.8)
MONOCYTES RELATIVE PERCENT: 6.1 %
NEUTROPHILS ABSOLUTE: 5.3 K/UL (ref 1.4–6.5)
NEUTROPHILS RELATIVE PERCENT: 55 %
ORGANISM: ABNORMAL
PDW BLD-RTO: 14.2 % (ref 11.5–14.5)
PERFORMED ON: ABNORMAL
PLATELET # BLD: 275 K/UL (ref 130–400)
POTASSIUM REFLEX MAGNESIUM: 4.1 MEQ/L (ref 3.4–4.9)
RBC # BLD: 3.95 M/UL (ref 4.2–5.4)
SODIUM BLD-SCNC: 142 MEQ/L (ref 135–144)
WBC # BLD: 9.6 K/UL (ref 4.8–10.8)

## 2022-12-19 PROCEDURE — 94664 DEMO&/EVAL PT USE INHALER: CPT

## 2022-12-19 PROCEDURE — 85025 COMPLETE CBC W/AUTO DIFF WBC: CPT

## 2022-12-19 PROCEDURE — 6370000000 HC RX 637 (ALT 250 FOR IP): Performed by: INTERNAL MEDICINE

## 2022-12-19 PROCEDURE — 36415 COLL VENOUS BLD VENIPUNCTURE: CPT

## 2022-12-19 PROCEDURE — 1210000000 HC MED SURG R&B

## 2022-12-19 PROCEDURE — 94761 N-INVAS EAR/PLS OXIMETRY MLT: CPT

## 2022-12-19 PROCEDURE — 80048 BASIC METABOLIC PNL TOTAL CA: CPT

## 2022-12-19 PROCEDURE — 94668 MNPJ CHEST WALL SBSQ: CPT

## 2022-12-19 PROCEDURE — 94640 AIRWAY INHALATION TREATMENT: CPT

## 2022-12-19 PROCEDURE — 6360000002 HC RX W HCPCS: Performed by: INTERNAL MEDICINE

## 2022-12-19 PROCEDURE — 2700000000 HC OXYGEN THERAPY PER DAY

## 2022-12-19 PROCEDURE — 96372 THER/PROPH/DIAG INJ SC/IM: CPT

## 2022-12-19 RX ORDER — PREDNISONE 20 MG/1
40 TABLET ORAL DAILY
Qty: 2 TABLET | Refills: 0 | Status: SHIPPED | OUTPATIENT
Start: 2022-12-20 | End: 2022-12-21

## 2022-12-19 RX ORDER — ALBUTEROL SULFATE 2.5 MG/3ML
2.5 SOLUTION RESPIRATORY (INHALATION) EVERY 6 HOURS PRN
Qty: 120 EACH | Refills: 3 | Status: SHIPPED | OUTPATIENT
Start: 2022-12-19

## 2022-12-19 RX ORDER — DOXYCYCLINE HYCLATE 100 MG/1
100 CAPSULE ORAL EVERY 12 HOURS SCHEDULED
Qty: 6 CAPSULE | Refills: 0 | Status: SHIPPED | OUTPATIENT
Start: 2022-12-19 | End: 2022-12-22

## 2022-12-19 RX ADMIN — METFORMIN HYDROCHLORIDE 1000 MG: 500 TABLET ORAL at 17:26

## 2022-12-19 RX ADMIN — ENOXAPARIN SODIUM 40 MG: 100 INJECTION SUBCUTANEOUS at 07:20

## 2022-12-19 RX ADMIN — GUAIFENESIN 600 MG: 600 TABLET ORAL at 07:20

## 2022-12-19 RX ADMIN — ALBUTEROL SULFATE 2.5 MG: 2.5 SOLUTION RESPIRATORY (INHALATION) at 10:38

## 2022-12-19 RX ADMIN — GLIPIZIDE 5 MG: 5 TABLET ORAL at 07:20

## 2022-12-19 RX ADMIN — ALBUTEROL SULFATE 2.5 MG: 2.5 SOLUTION RESPIRATORY (INHALATION) at 15:30

## 2022-12-19 RX ADMIN — PREDNISONE 40 MG: 20 TABLET ORAL at 07:20

## 2022-12-19 RX ADMIN — LISINOPRIL 40 MG: 20 TABLET ORAL at 07:20

## 2022-12-19 RX ADMIN — METFORMIN HYDROCHLORIDE 1000 MG: 500 TABLET ORAL at 07:20

## 2022-12-19 RX ADMIN — DOXYCYCLINE HYCLATE 100 MG: 100 CAPSULE ORAL at 07:20

## 2022-12-19 RX ADMIN — ALBUTEROL SULFATE 2.5 MG: 2.5 SOLUTION RESPIRATORY (INHALATION) at 07:46

## 2022-12-19 RX ADMIN — INSULIN LISPRO 1 UNITS: 100 INJECTION, SOLUTION INTRAVENOUS; SUBCUTANEOUS at 17:28

## 2022-12-19 NOTE — DISCHARGE SUMMARY
Hospital Medicine Discharge Summary    Bree Hernandez  :  1950  MRN:  [id number]    Admit date:  12/15/2022  Discharge date:  2022    Admitting Physician:  [facility],   Primary Care Physician:  Bladimir Staley MD      Discharge Diagnoses:      Dyspnea/tachypnea/likely acute bronchitis   Respiratory alkalosis in the setting of hyperventilation- ? Anxiety and above   Less likely COPD exacerbation-reported history of chronic bronchitis, no respiratory tests but will need outpatient PFT  HTN  Diabetes-non-insulin-dependent-with hyperglycemia       Chief Complaint   Patient presents with    Shortness of Breath     Hospital Course:     Patient was hospitalized for shortness of breath and tachypnea. She has reported history of COPD but no PFTs on record. She was seen by pulmonary medicine. Patient was thought to have acute respiratory failure due to acute bronchitis and acute tracheobronchitis. She was treated with steroids nebulizer treatments and doxycycline. Patient had home oxygen evaluation prior to discharge, she was weaned off oxygen completely. She did not need oxygen with activity. She was discharged home in stable condition with plan to follow-up with her [facility] pulmonologist.  She was discharged with antibiotics and steroid to finish 5 days total.    Exam on discharge:   BP (!) 134/58   Pulse 75   Temp 97.4 °F (36.3 °C) (Oral)   Resp 20   Ht 5' 4\" (1.626 m)   Wt 200 lb (90.7 kg)   SpO2 100%   BMI 34.33 kg/m²   General appearance: No apparent distress, appears stated age and cooperative. HEENT: Pupils equal, round, and reactive to light. Conjunctivae/corneas clear. Neck: Supple, with full range of motion. No jugular venous distention. Trachea midline. Respiratory:  Normal respiratory effort. Clear to auscultation, bilaterally without Rales/Wheezes/Rhonchi. Cardiovascular: Regular rate and rhythm with normal S1/S2 without murmurs, rubs or gallops.   Abdomen: Soft, non-tender, non-distended with normal bowel sounds. Musculoskeletal: No clubbing, cyanosis or edema bilaterally. Full range of motion without deformity. Skin: Skin color, texture, turgor normal.  No rashes or lesions. Neuro: Non Focal. Symetrical motor and tone. Nl Comprehension, Alert,awake and oriented. NL CN. Symetrical tone and reflexes. Psychiatric: Alert and oriented, thought content appropriate, normal insight  Capillary Refill: Brisk,< 3 seconds   Peripheral Pulses: +2 palpable, equal bilaterally     Patient was seen by the following consultants   Consults:  IP CONSULT TO PULMONOLOGY    Significant Diagnostic Studies:    Refer to chart     Please refer to chart if no studies are shown here    CT Head W/O Contrast    Result Date: 12/16/2022  EXAMINATION: CT OF THE HEAD WITHOUT CONTRAST  12/15/2022 8:27 pm TECHNIQUE: CT of the head was performed without the administration of intravenous contrast. Automated exposure control, iterative reconstruction, and/or weight based adjustment of the mA/kV was utilized to reduce the radiation dose to as low as reasonably achievable. COMPARISON: 05/25/2015. HISTORY: ORDERING SYSTEM PROVIDED HISTORY: confusion TECHNOLOGIST PROVIDED HISTORY: Reason for exam:->confusion Has a \"code stroke\" or \"stroke alert\" been called? ->No Decision Support Exception - unselect if not a suspected or confirmed emergency medical condition->Emergency Medical Condition (MA) What reading provider will be dictating this exam?->CRC FINDINGS: BRAIN/VENTRICLES: There is no acute intracranial hemorrhage, mass effect or midline shift. No abnormal extra-axial fluid collection. The gray-white differentiation is maintained without evidence of an acute infarct. There is no evidence of hydrocephalus. ORBITS: The visualized portion of the orbits demonstrate no acute abnormality. SINUSES: The visualized paranasal sinuses and mastoid air cells demonstrate no acute abnormality.  SOFT TISSUES/SKULL:  No acute abnormality of the visualized skull or soft tissues. No acute intracranial abnormality. CTA CHEST W WO CONTRAST PE Eval    Result Date: 12/16/2022  EXAMINATION: CTA OF THE CHEST WITH AND WITHOUT CONTRAST 12/15/2022 8:27 pm TECHNIQUE: CTA of the chest was performed before and after the administration of intravenous contrast.  Multiplanar reformatted images are provided for review. MIP images are provided for review. Automated exposure control, iterative reconstruction, and/or weight based adjustment of the mA/kV was utilized to reduce the radiation dose to as low as reasonably achievable. COMPARISON: 12/15/2022. HISTORY: ORDERING SYSTEM PROVIDED HISTORY: PE TECHNOLOGIST PROVIDED HISTORY: Reason for exam:->PE Decision Support Exception - unselect if not a suspected or confirmed emergency medical condition->Emergency Medical Condition (MA) What reading provider will be dictating this exam?->CRC FINDINGS: Vessels: Suboptimal opacification of the pulmonary vessels. No evidence of filling defect is visualized in the main pulmonary artery or the main right and left pulmonary arteries, distal to this the vessels are suboptimally evaluated due to suboptimal opacification and streaky artifact would recommend clinical correlation and if clinically indicated repeat study. No evidence of thoracic aortic aneurysm or dissection. No acute abnormality of the aorta. Mediastinum: Subtle mediastinal lymphadenopathy is seen. The heart is slightly prominent in size, the pericardium demonstrates no acute abnormality. Lungs/Pleura: The lungs are without acute process. No focal consolidation or pulmonary edema. No evidence of pleural effusion or pneumothorax. Upper Abdomen: Small type 1 hiatus hernia is seen. Distended gallbladder, no evidence of gallbladder wall thickening or pericholecystic stranding. Limited images of the upper abdomen demonstrates no acute pathology.  Soft Tissues/Bones: Degenerative bone changes are seen, no acute bone or soft tissue abnormality. Suboptimal evaluation of the pulmonary vessels cannot rule out distal pulmonary embolism. XR CHEST PORTABLE    Result Date: 12/15/2022  EXAMINATION: ONE XRAY VIEW OF THE CHEST 12/15/2022 6:42 pm COMPARISON: None. HISTORY: ORDERING SYSTEM PROVIDED HISTORY: sob TECHNOLOGIST PROVIDED HISTORY: Reason for exam:->sob What reading provider will be dictating this exam?->CRC FINDINGS: The lungs are without acute focal process. There is no effusion or pneumothorax. The cardiomediastinal silhouette is without acute process. The osseous structures are without acute process. No acute process.        Discharge Medications:         Medication List        START taking these medications      doxycycline hyclate 100 MG capsule  Commonly known as: VIBRAMYCIN  Take 1 capsule by mouth every 12 hours for 3 days     predniSONE 20 MG tablet  Commonly known as: DELTASONE  Take 2 tablets by mouth daily for 1 dose  Start taking on: December 20, 2022            CONTINUE taking these medications      albuterol (2.5 MG/3ML) 0.083% nebulizer solution  Commonly known as: PROVENTIL     glimepiride 2 MG tablet  Commonly known as: AMARYL     lisinopril 40 MG tablet  Commonly known as: PRINIVIL;ZESTRIL     metFORMIN 1000 MG tablet  Commonly known as: GLUCOPHAGE     pravastatin 40 MG tablet  Commonly known as: PRAVACHOL            STOP taking these medications      azithromycin 250 MG tablet  Commonly known as: Zithromax Z-Dalton     meloxicam 15 MG tablet  Commonly known as: MOBIC               Where to Get Your Medications        These medications were sent to [address] [place] [address] [address] [facility] [phone]  [address], [address] [facility]      Phone: [phone]   doxycycline hyclate 100 MG capsule  predniSONE 20 MG tablet         Disposition:   If discharged to Home, Any [place] [id number] needs that were indicated and/or required as been addressed and set up by Social Work. Condition at discharge: good     Activity: activity as tolerated    Total time taken for discharging this patient: 40 minutes. Greater than 70% of time was spent focused exclusively on this patient. Time was taken to review chart, discuss plans with consultants, reconciling medications, discussing plan answering questions with patient.      Yina Deluna DO  12/19/2022, 1:44 PM  ----------------------------------------------------------------------------------------------------------------------    Lovely Payor,

## 2022-12-19 NOTE — PROGRESS NOTES
Assessment completed. Pt resting in bed no distress. Lungs diminished with some wheezes throughout. SOB with exertion. Up to the [facility] stand by assist. Meds given per orders. Vitals stable. Alert and oriented. No nausea or emesis. No pain. Hourly rounds continue. call light in reach.

## 2022-12-19 NOTE — FLOWSHEET NOTE
1804: Patient to discharge, all discharge instructions reviewed with patient. Patient states understanding. All patients belongings sent home with patient. Denies pain at discharge, DME order for glucometer given to patient.  .Electronically signed by Laxmi Arias RN on 12/19/2022 at 6:06 PM

## 2022-12-19 NOTE — FLOWSHEET NOTE
4843: Patient assessment completed, patient alert and oriented times 4, denies pain when asked. Patient maintains O2 via nasal canula . At rest 100% without O2, patient spo2 decreases with exertion without O2, recommend O2 home eval. Perfect serve sent. Patient continues to have productive cough, minimal sputum, thick , yellow, patient unable to clear congestion. Electronically signed by Stuart Tom RN on 12/19/2022 at 9:15 AM

## 2022-12-19 NOTE — PROGRESS NOTES
Pulmonary Progress Note    2022 12:52 PM    Subjective:   Admit Date: 12/15/2022  PCP: Wilbur Johnson MD    Chief Complaint   Patient presents with    Shortness of Breath     Interval History: She has been on 2 L during the day but now she is on room air. Cough is better. Wheezing is better. Still have some chest congestion. Currently on doxycycline. Prednisone is at 40 mg.    14 points review of systems has been obtained and negative except to was mentioned in HPI. Medications:   Scheduled Meds:   albuterol  2.5 mg Nebulization Q4H While awake    guaiFENesin  600 mg Oral BID    doxycycline  100 mg Oral 2 times per day    enoxaparin  40 mg SubCUTAneous Daily    insulin lispro  0-4 Units SubCUTAneous TID WC    insulin lispro  0-4 Units SubCUTAneous Nightly    lisinopril  40 mg Oral Daily    metFORMIN  1,000 mg Oral BID    pravastatin  40 mg Oral Nightly    glipiZIDE  5 mg Oral QAM AC    predniSONE  40 mg Oral Daily     Continuous Infusions:   dextrose           Objective:   Vitals:   Temp (24hrs), Av.4 °F (36.3 °C), Min:97.2 °F (36.2 °C), Max:97.7 °F (36.5 °C)    BP (!) 134/58   Pulse 75   Temp 97.4 °F (36.3 °C) (Oral)   Resp 20   Ht 5' 4\" (1.626 m)   Wt 200 lb (90.7 kg)   SpO2 100%   BMI 34.33 kg/m²   I/O:24HR INTAKE/OUTPUT:    Intake/Output Summary (Last 24 hours) at 2022 1252  Last data filed at 2022 0535  Gross per 24 hour   Intake 310 ml   Output 700 ml   Net -390 ml      0701 -  0700  In: 310 [P.O.:300;  I.V.:10]  Out: 700 [Urine:700]  CVP:          Physical Exam:  General appearance - alert, ill appearing, and in no distress  Mental status - alert, oriented to person, place, and time  Eyes - pupils equal and reactive, extraocular eye movements intact  Nose - normal and patent, no erythema, discharge or polyps  Neck - supple, no significant adenopathy  Chest -diminished bilaterally to auscultation, bilateral wheezes, symmetric air entry  Heart - normal rate, regular rhythm, normal S1, S2, no murmurs, rubs, clicks or gallops  Abdomen - soft, nontender, nondistended, no masses or organomegaly  Rectal - deferred, not clinically indicated  Neurological - alert, oriented, normal speech, no focal findings or movement disorder noted, motor and sensory grossly normal bilaterally  Musculoskeletal - no joint tenderness, deformity or swelling  Extremities - peripheral pulses normal, no pedal edema, no clubbing or cyanosis  Skin - normal coloration and turgor, no rashes, no suspicious skin lesions noted              BMP:    Recent Labs     12/17/22  0503 12/18/22  0608 12/19/22  0649    145* 142   K 4.7 4.3 4.1    105 104   CO2 25 23 29   BUN 22 19 22   CREATININE 0.54 0.53 0.50   GLUCOSE 191* 166* 160*    . MG:3,PHOS:3)@  Ionized Calcium: No results found for: IONCA  CBC:   Recent Labs     12/18/22  0608 12/19/22  0649   WBC 9.0 9.6   HGB 11.6* 11.9*    275      ABG: No results for input(s): PH, PCO2, PO2 in the last 72 hours. Assessment and Plan:     Impression:    -Acute respiratory insufficiency due to acute bronchitis. -Acute tracheobronchitis  -Mild hypernatremia. -Diabetes.  -History of hypertension. Recommendations:    -She has been weaned off off of oxygen. Will need oxygen assessment prior to discharge.  -Steroids for total of 5 days.  -Antibiotic for total 5 days.  -She will need nebulizers as an outpatient. Her nebulizer solution is old. -She has a pulmonologist at the [facility] clinic that can be followed with.      Electronically signed by Cherelle Flaherty MD on 12/19/2022 at 12:52 PM

## 2022-12-19 NOTE — CARE COORDINATION
MET WITH PT AND FAMILY AT BEDSIDE. DC PLAN TO RETURN HOME. PT HAS GOOD FAMILY SUPPORT. HAS WALKER, BSC, RAMP AND GRAB BARS. HAD HOME O2 EVAL, DID NOT QUALIFY. PT DENIES FURTHER NEEDS. DECLINED Chyna Ledezma.

## 2022-12-19 NOTE — PROGRESS NOTES
12/19/22 1118   Resting (Room Air)   SpO2 100   HR 89   Resting (On O2)   Comments patient does not require O2 while resting   During Walk (Room Air)   SpO2 97   HR 90   Walk/Assistance Device Cane   Rate of Dyspnea 1   After Walk   SpO2 98   HR 87   Rate of Dyspnea 1   Comments patient does not require O2 upon ambulation   Does the Patient Qualify for Home O2 No   Does the Patient Need Portable Oxygen Tanks No

## 2022-12-21 LAB — CULTURE, BLOOD 2: NORMAL
